# Patient Record
Sex: MALE | Race: WHITE | NOT HISPANIC OR LATINO | URBAN - METROPOLITAN AREA
[De-identification: names, ages, dates, MRNs, and addresses within clinical notes are randomized per-mention and may not be internally consistent; named-entity substitution may affect disease eponyms.]

---

## 2017-10-11 ENCOUNTER — OFFICE (AMBULATORY)
Dept: URBAN - METROPOLITAN AREA CLINIC 64 | Facility: CLINIC | Age: 52
End: 2017-10-11

## 2017-10-11 VITALS
WEIGHT: 278 LBS | HEART RATE: 72 BPM | HEIGHT: 68 IN | SYSTOLIC BLOOD PRESSURE: 137 MMHG | DIASTOLIC BLOOD PRESSURE: 84 MMHG

## 2017-10-11 DIAGNOSIS — R74.0 NONSPECIFIC ELEVATION OF LEVELS OF TRANSAMINASE AND LACTIC A: ICD-10-CM

## 2017-10-11 DIAGNOSIS — K62.5 HEMORRHAGE OF ANUS AND RECTUM: ICD-10-CM

## 2017-10-11 LAB
COMP. METABOLIC PANEL (14): A/G RATIO: 1.4 (ref 1.2–2.2)
COMP. METABOLIC PANEL (14): ALBUMIN, SERUM: 4.4 G/DL (ref 3.5–5.5)
COMP. METABOLIC PANEL (14): ALKALINE PHOSPHATASE, S: 72 IU/L (ref 39–117)
COMP. METABOLIC PANEL (14): ALT (SGPT): 71 IU/L — HIGH (ref 0–44)
COMP. METABOLIC PANEL (14): AST (SGOT): 51 IU/L — HIGH (ref 0–40)
COMP. METABOLIC PANEL (14): BILIRUBIN, TOTAL: 0.6 MG/DL (ref 0–1.2)
COMP. METABOLIC PANEL (14): BUN/CREATININE RATIO: 13 (ref 9–20)
COMP. METABOLIC PANEL (14): BUN: 11 MG/DL (ref 6–24)
COMP. METABOLIC PANEL (14): CALCIUM, SERUM: 9.8 MG/DL (ref 8.7–10.2)
COMP. METABOLIC PANEL (14): CARBON DIOXIDE, TOTAL: 22 MMOL/L (ref 18–29)
COMP. METABOLIC PANEL (14): CHLORIDE, SERUM: 93 MMOL/L — LOW (ref 96–106)
COMP. METABOLIC PANEL (14): CREATININE, SERUM: 0.87 MG/DL (ref 0.76–1.27)
COMP. METABOLIC PANEL (14): EGFR IF AFRICN AM: 116 ML/MIN/1.73 (ref 59–?)
COMP. METABOLIC PANEL (14): EGFR IF NONAFRICN AM: 101 ML/MIN/1.73 (ref 59–?)
COMP. METABOLIC PANEL (14): GLOBULIN, TOTAL: 3.1 G/DL (ref 1.5–4.5)
COMP. METABOLIC PANEL (14): GLUCOSE, SERUM: 373 MG/DL — HIGH (ref 65–99)
COMP. METABOLIC PANEL (14): POTASSIUM, SERUM: 4.7 MMOL/L (ref 3.5–5.2)
COMP. METABOLIC PANEL (14): PROTEIN, TOTAL, SERUM: 7.5 G/DL (ref 6–8.5)
COMP. METABOLIC PANEL (14): SODIUM, SERUM: 135 MMOL/L (ref 134–144)
URINALYSIS, ROUTINE: APPEARANCE: CLEAR
URINALYSIS, ROUTINE: BILIRUBIN: NEGATIVE
URINALYSIS, ROUTINE: GLUCOSE: ABNORMAL
URINALYSIS, ROUTINE: KETONES: NEGATIVE
URINALYSIS, ROUTINE: MICROSCOPIC EXAMINATION: (no result)
URINALYSIS, ROUTINE: NITRITE, URINE: NEGATIVE
URINALYSIS, ROUTINE: OCCULT BLOOD: NEGATIVE
URINALYSIS, ROUTINE: PH: 6 (ref 5–7.5)
URINALYSIS, ROUTINE: PROTEIN: NEGATIVE
URINALYSIS, ROUTINE: SPECIFIC GRAVITY: >=1.03
URINALYSIS, ROUTINE: URINE-COLOR: YELLOW
URINALYSIS, ROUTINE: UROBILINOGEN,SEMI-QN: 0.2 MG/DL (ref 0.2–1)
URINALYSIS, ROUTINE: WBC ESTERASE: NEGATIVE

## 2017-10-11 PROCEDURE — 99202 OFFICE O/P NEW SF 15 MIN: CPT | Performed by: INTERNAL MEDICINE

## 2017-11-07 ENCOUNTER — ON CAMPUS - OUTPATIENT (AMBULATORY)
Dept: URBAN - METROPOLITAN AREA HOSPITAL 2 | Facility: HOSPITAL | Age: 52
End: 2017-11-07

## 2017-11-07 ENCOUNTER — OFFICE (AMBULATORY)
Dept: URBAN - METROPOLITAN AREA CLINIC 64 | Facility: CLINIC | Age: 52
End: 2017-11-07
Payer: COMMERCIAL

## 2017-11-07 VITALS
DIASTOLIC BLOOD PRESSURE: 79 MMHG | RESPIRATION RATE: 18 BRPM | SYSTOLIC BLOOD PRESSURE: 118 MMHG | SYSTOLIC BLOOD PRESSURE: 137 MMHG | RESPIRATION RATE: 16 BRPM | DIASTOLIC BLOOD PRESSURE: 75 MMHG | DIASTOLIC BLOOD PRESSURE: 45 MMHG | HEART RATE: 76 BPM | OXYGEN SATURATION: 100 % | SYSTOLIC BLOOD PRESSURE: 136 MMHG | OXYGEN SATURATION: 95 % | SYSTOLIC BLOOD PRESSURE: 147 MMHG | HEART RATE: 75 BPM | DIASTOLIC BLOOD PRESSURE: 73 MMHG | DIASTOLIC BLOOD PRESSURE: 84 MMHG | OXYGEN SATURATION: 98 % | SYSTOLIC BLOOD PRESSURE: 134 MMHG | OXYGEN SATURATION: 99 % | HEART RATE: 72 BPM | DIASTOLIC BLOOD PRESSURE: 70 MMHG | SYSTOLIC BLOOD PRESSURE: 126 MMHG | HEIGHT: 68 IN | DIASTOLIC BLOOD PRESSURE: 87 MMHG | OXYGEN SATURATION: 96 % | TEMPERATURE: 97.9 F | SYSTOLIC BLOOD PRESSURE: 125 MMHG | HEART RATE: 78 BPM | SYSTOLIC BLOOD PRESSURE: 132 MMHG | SYSTOLIC BLOOD PRESSURE: 141 MMHG | WEIGHT: 263 LBS | DIASTOLIC BLOOD PRESSURE: 81 MMHG | OXYGEN SATURATION: 97 % | DIASTOLIC BLOOD PRESSURE: 94 MMHG

## 2017-11-07 DIAGNOSIS — K64.1 SECOND DEGREE HEMORRHOIDS: ICD-10-CM

## 2017-11-07 DIAGNOSIS — K52.9 NONINFECTIVE GASTROENTERITIS AND COLITIS, UNSPECIFIED: ICD-10-CM

## 2017-11-07 DIAGNOSIS — K57.30 DIVERTICULOSIS OF LARGE INTESTINE WITHOUT PERFORATION OR ABS: ICD-10-CM

## 2017-11-07 DIAGNOSIS — K62.5 HEMORRHAGE OF ANUS AND RECTUM: ICD-10-CM

## 2017-11-07 DIAGNOSIS — K63.3 ULCER OF INTESTINE: ICD-10-CM

## 2017-11-07 LAB
GI HISTOLOGY: A. SELECT: (no result)
GI HISTOLOGY: B. UNSPECIFIED: (no result)
GI HISTOLOGY: PDF REPORT: (no result)

## 2017-11-07 PROCEDURE — 45380 COLONOSCOPY AND BIOPSY: CPT | Performed by: INTERNAL MEDICINE

## 2017-11-07 PROCEDURE — 88305 TISSUE EXAM BY PATHOLOGIST: CPT | Performed by: INTERNAL MEDICINE

## 2017-11-07 RX ORDER — HYDROCORTISONE 25 MG/G
5 CREAM TOPICAL
Qty: 1 | Refills: 1 | Status: ACTIVE
Start: 2017-11-07

## 2017-11-07 RX ADMIN — PROPOFOL: 10 INJECTION, EMULSION INTRAVENOUS at 14:15

## 2017-11-08 ENCOUNTER — HOSPITAL ENCOUNTER (OUTPATIENT)
Dept: LAB | Facility: HOSPITAL | Age: 52
Setting detail: SPECIMEN
Discharge: HOME | End: 2017-11-08
Attending: INTERNAL MEDICINE | Admitting: INTERNAL MEDICINE

## 2017-11-08 LAB
ANION GAP SERPL CALC-SCNC: 11.9 MMOL/L (ref 10–20)
BUN SERPL-MCNC: 12 MG/DL (ref 8–20)
BUN/CREAT SERPL: 12 (ref 6.2–20.3)
CALCIUM SERPL-MCNC: 9.3 MG/DL (ref 8.9–10.3)
CHLORIDE SERPL-SCNC: 105 MMOL/L (ref 101–111)
CONV CO2: 25 MMOL/L (ref 22–32)
CREAT UR-MCNC: 1 MG/DL (ref 0.7–1.2)
GLUCOSE SERPL-MCNC: 119 MG/DL (ref 65–99)
POTASSIUM SERPL-SCNC: 3.9 MMOL/L (ref 3.6–5.1)
SODIUM SERPL-SCNC: 138 MMOL/L (ref 136–144)

## 2017-12-11 ENCOUNTER — OFFICE (AMBULATORY)
Dept: URBAN - METROPOLITAN AREA CLINIC 64 | Facility: CLINIC | Age: 52
End: 2017-12-11

## 2017-12-11 VITALS
HEART RATE: 81 BPM | WEIGHT: 267 LBS | SYSTOLIC BLOOD PRESSURE: 102 MMHG | DIASTOLIC BLOOD PRESSURE: 66 MMHG | HEIGHT: 68 IN

## 2017-12-11 DIAGNOSIS — R74.0 NONSPECIFIC ELEVATION OF LEVELS OF TRANSAMINASE AND LACTIC A: ICD-10-CM

## 2017-12-11 DIAGNOSIS — K62.5 HEMORRHAGE OF ANUS AND RECTUM: ICD-10-CM

## 2017-12-11 LAB
COMP. METABOLIC PANEL (14): A/G RATIO: 1.7 (ref 1.2–2.2)
COMP. METABOLIC PANEL (14): ALBUMIN, SERUM: 4.5 G/DL (ref 3.5–5.5)
COMP. METABOLIC PANEL (14): ALKALINE PHOSPHATASE, S: 57 IU/L (ref 39–117)
COMP. METABOLIC PANEL (14): ALT (SGPT): 56 IU/L — HIGH (ref 0–44)
COMP. METABOLIC PANEL (14): AST (SGOT): 38 IU/L (ref 0–40)
COMP. METABOLIC PANEL (14): BILIRUBIN, TOTAL: 0.7 MG/DL (ref 0–1.2)
COMP. METABOLIC PANEL (14): BUN/CREATININE RATIO: 11 (ref 9–20)
COMP. METABOLIC PANEL (14): BUN: 11 MG/DL (ref 6–24)
COMP. METABOLIC PANEL (14): CALCIUM, SERUM: 9.4 MG/DL (ref 8.7–10.2)
COMP. METABOLIC PANEL (14): CARBON DIOXIDE, TOTAL: 24 MMOL/L (ref 18–29)
COMP. METABOLIC PANEL (14): CHLORIDE, SERUM: 100 MMOL/L (ref 96–106)
COMP. METABOLIC PANEL (14): CREATININE, SERUM: 0.96 MG/DL (ref 0.76–1.27)
COMP. METABOLIC PANEL (14): EGFR IF AFRICN AM: 106 ML/MIN/1.73 (ref 59–?)
COMP. METABOLIC PANEL (14): EGFR IF NONAFRICN AM: 92 ML/MIN/1.73 (ref 59–?)
COMP. METABOLIC PANEL (14): GLOBULIN, TOTAL: 2.6 G/DL (ref 1.5–4.5)
COMP. METABOLIC PANEL (14): GLUCOSE, SERUM: 109 MG/DL — HIGH (ref 65–99)
COMP. METABOLIC PANEL (14): POTASSIUM, SERUM: 4.5 MMOL/L (ref 3.5–5.2)
COMP. METABOLIC PANEL (14): PROTEIN, TOTAL, SERUM: 7.1 G/DL (ref 6–8.5)
COMP. METABOLIC PANEL (14): SODIUM, SERUM: 142 MMOL/L (ref 134–144)

## 2017-12-11 PROCEDURE — 99213 OFFICE O/P EST LOW 20 MIN: CPT | Performed by: INTERNAL MEDICINE

## 2017-12-13 ENCOUNTER — HOSPITAL ENCOUNTER (OUTPATIENT)
Dept: LAB | Facility: HOSPITAL | Age: 52
Setting detail: SPECIMEN
Discharge: HOME | End: 2017-12-13
Attending: INTERNAL MEDICINE | Admitting: INTERNAL MEDICINE

## 2017-12-13 LAB
ALBUMIN SERPL-MCNC: 4.2 G/DL (ref 3.5–4.8)
ALBUMIN/GLOB SERPL: 1.3 {RATIO} (ref 1–1.7)
ALP SERPL-CCNC: 52 IU/L (ref 32–91)
ALT SERPL-CCNC: 56 IU/L (ref 17–63)
ANION GAP SERPL CALC-SCNC: 11.4 MMOL/L (ref 10–20)
AST SERPL-CCNC: 33 IU/L (ref 15–41)
BILIRUB SERPL-MCNC: 0.9 MG/DL (ref 0.3–1.2)
BUN SERPL-MCNC: 14 MG/DL (ref 8–20)
BUN/CREAT SERPL: 15.6 (ref 6.2–20.3)
CALCIUM SERPL-MCNC: 9.1 MG/DL (ref 8.9–10.3)
CHLORIDE SERPL-SCNC: 105 MMOL/L (ref 101–111)
CHOLEST SERPL-MCNC: 147 MG/DL
CHOLEST/HDLC SERPL: 4.8 {RATIO}
CONV CO2: 25 MMOL/L (ref 22–32)
CONV LDL CHOLESTEROL DIRECT: 89 MG/DL (ref 0–100)
CONV TOTAL PROTEIN: 7.4 G/DL (ref 6.1–7.9)
CREAT UR-MCNC: 0.9 MG/DL (ref 0.7–1.2)
GLOBULIN UR ELPH-MCNC: 3.2 G/DL (ref 2.5–3.8)
GLUCOSE SERPL-MCNC: 131 MG/DL (ref 65–99)
HDLC SERPL-MCNC: 30 MG/DL
LDLC/HDLC SERPL: 2.9 {RATIO}
LIPID INTERPRETATION: ABNORMAL
POTASSIUM SERPL-SCNC: 4.4 MMOL/L (ref 3.6–5.1)
SODIUM SERPL-SCNC: 137 MMOL/L (ref 136–144)
TRIGL SERPL-MCNC: 109 MG/DL
VLDLC SERPL CALC-MCNC: 28 MG/DL

## 2018-02-07 ENCOUNTER — HOSPITAL ENCOUNTER (OUTPATIENT)
Dept: LAB | Facility: HOSPITAL | Age: 53
Setting detail: SPECIMEN
Discharge: HOME | End: 2018-02-07
Attending: INTERNAL MEDICINE | Admitting: INTERNAL MEDICINE

## 2018-06-06 ENCOUNTER — HOSPITAL ENCOUNTER (OUTPATIENT)
Dept: LAB | Facility: HOSPITAL | Age: 53
Setting detail: SPECIMEN
Discharge: HOME | End: 2018-06-06
Attending: INTERNAL MEDICINE | Admitting: INTERNAL MEDICINE

## 2018-06-06 LAB
ANION GAP SERPL CALC-SCNC: 14 MMOL/L (ref 10–20)
BUN SERPL-MCNC: 16 MG/DL (ref 8–20)
BUN/CREAT SERPL: 16 (ref 6.2–20.3)
CALCIUM SERPL-MCNC: 9 MG/DL (ref 8.9–10.3)
CHLORIDE SERPL-SCNC: 100 MMOL/L (ref 101–111)
CONV CO2: 25 MMOL/L (ref 22–32)
CREAT UR-MCNC: 1 MG/DL (ref 0.7–1.2)
GLUCOSE SERPL-MCNC: 108 MG/DL (ref 65–99)
POTASSIUM SERPL-SCNC: 4 MMOL/L (ref 3.6–5.1)
SODIUM SERPL-SCNC: 135 MMOL/L (ref 136–144)

## 2018-12-03 ENCOUNTER — HOSPITAL ENCOUNTER (OUTPATIENT)
Dept: LAB | Facility: HOSPITAL | Age: 53
Setting detail: SPECIMEN
Discharge: HOME | End: 2018-12-03
Attending: INTERNAL MEDICINE | Admitting: INTERNAL MEDICINE

## 2018-12-03 LAB
ALBUMIN SERPL-MCNC: 3.9 G/DL (ref 3.5–4.8)
ALBUMIN/GLOB SERPL: 1.6 {RATIO} (ref 1–1.7)
ALP SERPL-CCNC: 52 IU/L (ref 32–91)
ALT SERPL-CCNC: 60 IU/L (ref 17–63)
ANION GAP SERPL CALC-SCNC: 13.5 MMOL/L (ref 10–20)
AST SERPL-CCNC: 36 IU/L (ref 15–41)
BILIRUB SERPL-MCNC: 1.2 MG/DL (ref 0.3–1.2)
BUN SERPL-MCNC: 18 MG/DL (ref 8–20)
BUN/CREAT SERPL: 20 (ref 6.2–20.3)
CALCIUM SERPL-MCNC: 8.9 MG/DL (ref 8.9–10.3)
CHLORIDE SERPL-SCNC: 104 MMOL/L (ref 101–111)
CHOLEST SERPL-MCNC: 143 MG/DL
CHOLEST/HDLC SERPL: 4.2 {RATIO}
CONV CO2: 25 MMOL/L (ref 22–32)
CONV LDL CHOLESTEROL DIRECT: 92 MG/DL (ref 0–100)
CONV TOTAL PROTEIN: 6.3 G/DL (ref 6.1–7.9)
CREAT UR-MCNC: 0.9 MG/DL (ref 0.7–1.2)
GLOBULIN UR ELPH-MCNC: 2.4 G/DL (ref 2.5–3.8)
GLUCOSE SERPL-MCNC: 139 MG/DL (ref 65–99)
HBA1C MFR BLD: 6.1 % (ref 0–5.6)
HDLC SERPL-MCNC: 34 MG/DL
LDLC/HDLC SERPL: 2.7 {RATIO}
LIPID INTERPRETATION: ABNORMAL
POTASSIUM SERPL-SCNC: 4.5 MMOL/L (ref 3.6–5.1)
SODIUM SERPL-SCNC: 138 MMOL/L (ref 136–144)
TRIGL SERPL-MCNC: 224 MG/DL
VLDLC SERPL CALC-MCNC: 17.2 MG/DL

## 2019-03-21 ENCOUNTER — HOSPITAL ENCOUNTER (OUTPATIENT)
Dept: CARDIOLOGY | Facility: HOSPITAL | Age: 54
Discharge: HOME | End: 2019-03-21
Attending: INTERNAL MEDICINE | Admitting: INTERNAL MEDICINE

## 2019-04-23 ENCOUNTER — HOSPITAL ENCOUNTER (OUTPATIENT)
Dept: CARDIOLOGY | Facility: HOSPITAL | Age: 54
Discharge: HOME | End: 2019-04-23

## 2019-05-29 ENCOUNTER — HOSPITAL ENCOUNTER (OUTPATIENT)
Dept: LAB | Facility: HOSPITAL | Age: 54
Setting detail: SPECIMEN
Discharge: HOME | End: 2019-05-29
Attending: NURSE PRACTITIONER | Admitting: NURSE PRACTITIONER

## 2019-05-29 LAB
ALBUMIN SERPL-MCNC: 4.1 G/DL (ref 3.5–4.8)
ALBUMIN/GLOB SERPL: 1.4 {RATIO} (ref 1–1.7)
ALP SERPL-CCNC: 45 IU/L (ref 32–91)
ALT SERPL-CCNC: 68 IU/L (ref 17–63)
ANION GAP SERPL CALC-SCNC: 16.2 MMOL/L (ref 10–20)
AST SERPL-CCNC: 39 IU/L (ref 15–41)
BILIRUB SERPL-MCNC: 0.8 MG/DL (ref 0.3–1.2)
BUN SERPL-MCNC: 16 MG/DL (ref 8–20)
BUN/CREAT SERPL: 16 (ref 6.2–20.3)
CALCIUM SERPL-MCNC: 9.6 MG/DL (ref 8.9–10.3)
CHLORIDE SERPL-SCNC: 105 MMOL/L (ref 101–111)
CHOLEST SERPL-MCNC: 139 MG/DL
CHOLEST/HDLC SERPL: 5.1 {RATIO}
CONV CO2: 24 MMOL/L (ref 22–32)
CONV LDL CHOLESTEROL DIRECT: 87 MG/DL (ref 0–100)
CONV TOTAL PROTEIN: 7.1 G/DL (ref 6.1–7.9)
CREAT UR-MCNC: 1 MG/DL (ref 0.7–1.2)
GLOBULIN UR ELPH-MCNC: 3 G/DL (ref 2.5–3.8)
GLUCOSE SERPL-MCNC: 131 MG/DL (ref 65–99)
HBA1C MFR BLD: 6.3 % (ref 0–5.6)
HDLC SERPL-MCNC: 27 MG/DL
LDLC/HDLC SERPL: 3.2 {RATIO}
LIPID INTERPRETATION: ABNORMAL
POTASSIUM SERPL-SCNC: 5.2 MMOL/L (ref 3.6–5.1)
SODIUM SERPL-SCNC: 140 MMOL/L (ref 136–144)
TRIGL SERPL-MCNC: 178 MG/DL
VLDLC SERPL CALC-MCNC: 25.2 MG/DL

## 2019-06-05 ENCOUNTER — CONVERSION ENCOUNTER (OUTPATIENT)
Dept: ENDOCRINOLOGY | Facility: CLINIC | Age: 54
End: 2019-06-05

## 2019-06-05 VITALS
WEIGHT: 269 LBS | BODY MASS INDEX: 42.22 KG/M2 | DIASTOLIC BLOOD PRESSURE: 74 MMHG | HEIGHT: 67 IN | SYSTOLIC BLOOD PRESSURE: 110 MMHG | HEART RATE: 68 BPM | OXYGEN SATURATION: 98 %

## 2019-06-06 NOTE — PROGRESS NOTES
Visit Type:  Follow-up Visit  Referring Provider:  Yomaira Matos   Primary Provider:  Yomaira    CC:  DM 2.    History of Present Illness:  This is a 53 years old male who presents with follow-up of diabetes Type 2.  He takes Metformin 500  1 +2. The patient denies polyuria, nocturia and nocturnal hypoglycemia.  The patient denies any neuropathic pain, numbness and angina.  Since the last   visit the patient admits to HBGM testing - 1-2 x daily:, dietary compliance is good and complying with medications.  A1c is 6.3 % .  HTN: He takes Lisinopril 10 mg daily.  potassium is 5.2 ( H ) .         Standards of Care   Discussed Carrying Glucose Source: Advised  Discussed Wear DM Alert ID: Advised  NO  Last Eye Exam: 2018  Influenza vaccine: REFUSED  Pneumovax: REFUSED      Past Medical History:     Reviewed history from 03/20/2019 and no changes required:        Diabetes, Type 2        Hyperlipidemia        Hypertension    Past Surgical History:     Reviewed history from 11/08/2017 and no changes required:        none    Family History Summary:      Reviewed history Last on 03/20/2019 and no changes required:06/05/2019  Father - Has Heart Disease - Entered On: 11/8/2017  Mother - Has Heart Disease - Entered On: 11/8/2017  Father - Has Other Cancer - Entered On: 11/8/2017  Mother - Has High Cholesterol - Entered On: 11/8/2017      Social History:     Reviewed history from 03/20/2019 and no changes required:        Patient has never smoked.        Passive Smoke: N        Alcohol Use: N        Drug Use: N        HIV/High Risk: N        Regular Exercise: N                Risk Factors:     Smoked Tobacco Use:  Never smoker  Smokeless Tobacco Use:  Never  Passive smoke exposure:  no  Drug use:  no  HIV high-risk behavior:  no  Alcohol use:  no  Exercise:  no    Previous Tobacco Use: Signed On - 05/02/2019  Smoked Tobacco Use:  Never smoker  Smokeless Tobacco Use:  Never  Passive smoke exposure:  no  Drug use:  no  HIV high-risk  behavior:  no    Previous Alcohol Use: Signed On - 05/02/2019  Alcohol use:  no  Exercise:  no    Active Medications (reviewed today):  LISINOPRIL 10MG TABLETS (LISINOPRIL) TAKE 1 TABLET BY MOUTH DAILY  ATORVASTATIN 20MG TABLETS (ATORVASTATIN CALCIUM) TAKE 1 TABLET BY MOUTH DAILY  METFORMIN ER 500MG 24HR TABS (METFORMIN HCL) TAKE 1 TABLET BY MOUTH EVERY MORNING AND 2 TABLETS BY MOUTH WITH EVENING MEAL  E400 CAPSULE (VITAMIN E CAPS) 2 caps daily  [employer] FISH OIL 1000 MG ORAL CAPSULE (OMEGA-3 FATTY ACIDS) 1 cap daily    Current Allergies (reviewed today):  No known allergies      Review of Systems     General       Denies fever, fever & chills, sweat, sweating, appetite loss, appetite, weight loss, malaise and fatigue.    Eyes       Denies vision loss - 1 eye, double vision, eye irritation, vision loss - both eyes, blurring, eye pain, halos, discharge and light sensitivity.    ENT       Denies ringing in the ears, ear discharge, earache, decreased hearing, nasal congestion, nosebleeds, difficulty swallowing, hoarseness and sore throat.    CV       Denies difficulty breathing at night, near fainting, chest pain or discomfort, racing/skipping heart beats, fatigue, lightheadedness, shortness of breath with exertion, palpitations, swelling of hands or feet, difficulty breathing while lying down,   fainting, leg cramps with exertion, bluish discoloration of lips or nails and weight gain.    Resp       Denies sleep disturbances due to breathing, cough, shortness of breath, coughing up blood, chest discomfort, wheezing, excessive sputum and excessive snoring.    GI       Denies nausea, vomiting, yellowish skin color, abdominal pain, diarrhea and bloody stools.           Denies dysuria, urinary frequency and incontinence.    MS       Denies muscle cramps, joint pain, joint swelling, presence of joint fluid, back pain, stiffness, muscle weakness, arthritis, gout, loss of strength and muscle aches.    Derm       Denies excessive  perspiration, night sweats, suspicious lesions, changes in nail beds, dryness, poor wound healing, unusual hair distribution, skin cancer, itching, changes in color of skin, flushing and rash.    Neuro       Denies difficulty with concentration, poor balance, headaches, disturbances in coordination, numbness, inability to speak, falling down, tingling, brief paralysis, visual disturbances, seizures, weakness, sensation of room spinning, tremors, fainting,   excessive daytime sleeping and memory loss.    Psych       Denies sense of great danger, anxiety, thoughts of suicide, mental problems, depression, thoughts of violence and frightening visions or sounds.    Endo       Denies excessive hunger, cold intolerance, heat intolerance, excessive urination, excessive thirst and weight change.    Heme       Denies enlarged lymph nodes, bleeding, skin discoloration, abnormal bruising and fevers.    Allergy       Denies persistent infections, hives or rash, seasonal allergies and HIV exposure.      Vital Signs:    Patient Profile:    53 Years Old Male  Height:     67 inches  Weight:     269 pounds  BMI:        42.13     O2 Sat:     98 %  Pulse rate: 68 / minute  BP Sittin / 74  (left arm)    Cuff size:  large      Problems: Active problems were reviewed with the patient during this visit.  Medications: Medications were reviewed with the patient during this visit.  Allergies: Allergies were reviewed with the patient during this visit.  No Known Allergy.        Vitals Entered By: Vilma Bowers (2019 8:13 AM)      Physical Exam    General:      Well developed, well nourished, in no acute distress.  Head:      Normocephalic and atraumatic.  Eyes:      Head, eyes, ears, nose and throat examination reveals pupils that are equally reactive light. Conjunctivae are clear.   Neck:      Neck is supple with no JVD or LAD.  Thyroid is not palpable.  No Carotid bruits are heard at this time.  Lungs:      Chest and lungs  are symmetrical and clear to auscultation bilaterally.  Heart:      Cardiovascular examination reveals S1 and S2 are regular with no murmurs heard at this time.  Abdomen:      Abdomen is soft and nontender with no organomegaly.  Bowel sounds are positive.  Msk:      No deformity or scoliosis noted with normal posture and gait.  Extremities:      no clubbing, cyanosis, edema, or deformity noted   Neurologic:      No focal deficits, CN II-XII grossly intact. Hearing normal to conversational speech.   Skin:      Intact without lesions or rashes.  Cervical Nodes:      No significant adenopathy.  Psych:      Alert and cooperative; normal mood and affect; normal attention span and concentration.      Blood Pressure:  Today's BP: 110/74 mm Hg    Labwork:   Most Recent Lab Results:   LDL: 87 mg/dL 05/29/2019  HbA1c: : 6.3 % 05/29/2019      Impression & Recommendations:    Problem # 1:  Diabetes, Type 2 (ICD-250.00) (KVY01-E51.9)  A1c is at goal. Continue metformin same dose .   His updated medication list for this problem includes:     Metformin Er 500mg 24hr Tabs (Metformin hcl) ..... Take 1 tablet by mouth every morning and 2 tablets by mouth with evening meal    Orders:  Glucose (84019)  Ofc Vst, Est Level IV (18151)  [facility] COMPREHENSIVE METABOLIC PANEL (CMP) (MPC)  [facility] HEMOGLOBIN A1c (A1DCA)  [facility] LIPID PANEL (LIPID)  [facility] MICROALBUMIN/CREATININE RATIO (MACRE)      Problem # 2:  Hypertension, benign (ICD-401.1) ([id number])  Potassium is high - Discontinue Lisinopril .   The following medications were removed from the medication list:     Lisinopril 10mg Tablets (Lisinopril) ..... Take 1 tablet by mouth daily    Orders:  Ofc Vst, Est Level IV (08621)  [facility] COMPREHENSIVE METABOLIC PANEL (CMP) (MPC)  [facility] HEMOGLOBIN A1c (A1DCA)  [facility] LIPID PANEL (LIPID)  [facility] MICROALBUMIN/CREATININE RATIO (MACRE)      Problem # 3:  Hyperlipidemia (ICD-272.4) (SJI42-F35.5)  LDL is at goal. Continue Statins   His updated medication list for this  problem includes:     Atorvastatin 20mg Tablets (Atorvastatin calcium) ..... Take 1 tablet by mouth daily    Orders:  Ofc Vst, Est Level IV (74787)  [facility] COMPREHENSIVE METABOLIC PANEL (CMP) (MPC)  [facility] HEMOGLOBIN A1c (A1DCA)  [facility] LIPID PANEL (LIPID)  [facility] MICROALBUMIN/CREATININE RATIO (MACRE)      Problem # 4:  Fatty liver disease (ICD-571.8) (ZGI59-Y00.0)  ALT is elevated - Continue to monitor - Advised for weight loss .   Orders:  Ofc Vst, Est Level IV (84103)  [facility] COMPREHENSIVE METABOLIC PANEL (CMP) (MPC)  [facility] HEMOGLOBIN A1c (A1DCA)  [facility] LIPID PANEL (LIPID)  [facility] MICROALBUMIN/CREATININE RATIO (MACRE)      Other Orders:  Glucose (88816)      Patient Instructions:  1)  Please schedule a follow-up appointment in 6 months.  2)  Discussed importance of regular exercise and recommended starting or continuing a regular exercise program for good health.  3)  The patient was encouraged to lose weight for better health.                    Medication Administration    Orders Added:  1)  Glucose [87797]  2)  Ofc Vst, Est Level IV [85387]  3)  [facility] COMPREHENSIVE METABOLIC PANEL (CMP) [MPC]  4)  [facility] HEMOGLOBIN A1c [A1DCA]  5)  [facility] LIPID PANEL [LIPID]  6)  [facility] MICROALBUMIN/CREATININE RATIO [MACRE]  ]  Technician: Anoop Gregory         Date/Time Collected: June 5, 2019 8:12 AM)  Date/Time Received: June 5, 2019 8:12 AM)  Performed by: ANOOP GREGORY    Glucose (fast): 107 mg/dL       65-99 mg/dL (normal range)                Electronically signed by Pradip Castelan MD on 06/05/2019 at 9:10 AM  ________________________________________________________________________       Disclaimer: Converted Note message may not contain all data elements that existed in the legacy source system. Please see [employer] for the original note details.

## 2019-09-05 RX ORDER — ATORVASTATIN CALCIUM 20 MG/1
TABLET, FILM COATED ORAL
Qty: 90 TABLET | Refills: 0 | Status: SHIPPED | OUTPATIENT
Start: 2019-09-05 | End: 2020-01-16

## 2019-09-09 RX ORDER — METFORMIN HYDROCHLORIDE 500 MG/1
TABLET, EXTENDED RELEASE ORAL
COMMUNITY
Start: 2019-05-16 | End: 2019-09-09 | Stop reason: SDUPTHER

## 2019-09-09 RX ORDER — METFORMIN HYDROCHLORIDE 500 MG/1
TABLET, EXTENDED RELEASE ORAL
Qty: 270 TABLET | Refills: 1 | Status: SHIPPED | OUTPATIENT
Start: 2019-09-09 | End: 2021-06-01

## 2019-10-31 DIAGNOSIS — E78.5 HYPERLIPIDEMIA, UNSPECIFIED HYPERLIPIDEMIA TYPE: Primary | ICD-10-CM

## 2019-10-31 DIAGNOSIS — I10 HYPERTENSION, BENIGN: ICD-10-CM

## 2019-10-31 DIAGNOSIS — E11.65 TYPE 2 DIABETES MELLITUS WITH HYPERGLYCEMIA, UNSPECIFIED WHETHER LONG TERM INSULIN USE (HCC): ICD-10-CM

## 2019-10-31 PROBLEM — E11.9 TYPE 2 DIABETES MELLITUS WITHOUT COMPLICATIONS (HCC): Status: ACTIVE | Noted: 2018-02-28

## 2019-10-31 PROBLEM — I25.10 CORONARY ATHEROSCLEROSIS: Status: ACTIVE | Noted: 2019-05-02

## 2019-10-31 PROBLEM — Z71.89 OTHER SPECIFIED COUNSELING: Status: ACTIVE | Noted: 2019-03-20

## 2019-10-31 PROBLEM — K76.0 STEATOSIS OF LIVER: Status: ACTIVE | Noted: 2019-06-05

## 2019-10-31 PROBLEM — E55.9 VITAMIN D DEFICIENCY: Status: ACTIVE | Noted: 2018-02-28

## 2019-10-31 PROBLEM — R73.9 HYPERGLYCEMIA: Status: ACTIVE | Noted: 2017-11-08

## 2019-11-25 ENCOUNTER — LAB (OUTPATIENT)
Dept: LAB | Facility: HOSPITAL | Age: 54
End: 2019-11-25

## 2019-11-25 DIAGNOSIS — I10 HYPERTENSION, BENIGN: ICD-10-CM

## 2019-11-25 DIAGNOSIS — E78.5 HYPERLIPIDEMIA, UNSPECIFIED HYPERLIPIDEMIA TYPE: ICD-10-CM

## 2019-11-25 DIAGNOSIS — E11.65 TYPE 2 DIABETES MELLITUS WITH HYPERGLYCEMIA, UNSPECIFIED WHETHER LONG TERM INSULIN USE (HCC): ICD-10-CM

## 2019-11-25 LAB
25(OH)D3 SERPL-MCNC: 23.8 NG/ML (ref 30–100)
ALBUMIN SERPL-MCNC: 4.5 G/DL (ref 3.5–5.2)
ALBUMIN UR-MCNC: <1.2 MG/DL
ALBUMIN/GLOB SERPL: 1.5 G/DL
ALP SERPL-CCNC: 55 U/L (ref 39–117)
ALT SERPL W P-5'-P-CCNC: 70 U/L (ref 1–41)
ANION GAP SERPL CALCULATED.3IONS-SCNC: 11.1 MMOL/L (ref 5–15)
AST SERPL-CCNC: 40 U/L (ref 1–40)
BILIRUB SERPL-MCNC: 0.5 MG/DL (ref 0.2–1.2)
BUN BLD-MCNC: 14 MG/DL (ref 6–20)
BUN/CREAT SERPL: 15.7 (ref 7–25)
CALCIUM SPEC-SCNC: 9.2 MG/DL (ref 8.6–10.5)
CHLORIDE SERPL-SCNC: 102 MMOL/L (ref 98–107)
CHOLEST SERPL-MCNC: 150 MG/DL (ref 0–200)
CO2 SERPL-SCNC: 25.9 MMOL/L (ref 22–29)
CREAT BLD-MCNC: 0.89 MG/DL (ref 0.76–1.27)
CREAT UR-MCNC: 72.8 MG/DL
GFR SERPL CREATININE-BSD FRML MDRD: 90 ML/MIN/1.73
GLOBULIN UR ELPH-MCNC: 3.1 GM/DL
GLUCOSE BLD-MCNC: 131 MG/DL (ref 65–99)
HBA1C MFR BLD: 6.8 % (ref 3.5–5.6)
HDLC SERPL-MCNC: 33 MG/DL (ref 40–60)
LDLC SERPL CALC-MCNC: 91 MG/DL (ref 0–100)
LDLC/HDLC SERPL: 2.75 {RATIO}
MICROALBUMIN/CREAT UR: NORMAL MG/G{CREAT}
POTASSIUM BLD-SCNC: 4.7 MMOL/L (ref 3.5–5.2)
PROT SERPL-MCNC: 7.6 G/DL (ref 6–8.5)
SODIUM BLD-SCNC: 139 MMOL/L (ref 136–145)
TRIGL SERPL-MCNC: 131 MG/DL (ref 0–150)
VLDLC SERPL-MCNC: 26.2 MG/DL (ref 5–40)

## 2019-11-25 PROCEDURE — 82043 UR ALBUMIN QUANTITATIVE: CPT

## 2019-11-25 PROCEDURE — 80061 LIPID PANEL: CPT

## 2019-11-25 PROCEDURE — 80053 COMPREHEN METABOLIC PANEL: CPT

## 2019-11-25 PROCEDURE — 82570 ASSAY OF URINE CREATININE: CPT

## 2019-11-25 PROCEDURE — 82306 VITAMIN D 25 HYDROXY: CPT

## 2019-11-25 PROCEDURE — 83036 HEMOGLOBIN GLYCOSYLATED A1C: CPT

## 2019-11-25 PROCEDURE — 36415 COLL VENOUS BLD VENIPUNCTURE: CPT

## 2019-12-04 ENCOUNTER — OFFICE VISIT (OUTPATIENT)
Dept: ENDOCRINOLOGY | Facility: CLINIC | Age: 54
End: 2019-12-04

## 2019-12-04 VITALS
HEART RATE: 76 BPM | SYSTOLIC BLOOD PRESSURE: 132 MMHG | WEIGHT: 276 LBS | OXYGEN SATURATION: 98 % | DIASTOLIC BLOOD PRESSURE: 88 MMHG | HEIGHT: 68 IN | BODY MASS INDEX: 41.83 KG/M2

## 2019-12-04 DIAGNOSIS — E78.5 HYPERLIPIDEMIA, UNSPECIFIED HYPERLIPIDEMIA TYPE: ICD-10-CM

## 2019-12-04 DIAGNOSIS — E11.9 TYPE 2 DIABETES MELLITUS WITHOUT COMPLICATION, WITHOUT LONG-TERM CURRENT USE OF INSULIN (HCC): ICD-10-CM

## 2019-12-04 DIAGNOSIS — E11.65 TYPE 2 DIABETES MELLITUS WITH HYPERGLYCEMIA, UNSPECIFIED WHETHER LONG TERM INSULIN USE (HCC): Primary | ICD-10-CM

## 2019-12-04 DIAGNOSIS — E55.9 VITAMIN D DEFICIENCY: ICD-10-CM

## 2019-12-04 LAB — GLUCOSE BLDC GLUCOMTR-MCNC: 144 MG/DL (ref 70–130)

## 2019-12-04 PROCEDURE — 99213 OFFICE O/P EST LOW 20 MIN: CPT | Performed by: INTERNAL MEDICINE

## 2019-12-04 PROCEDURE — 82962 GLUCOSE BLOOD TEST: CPT | Performed by: INTERNAL MEDICINE

## 2019-12-04 NOTE — PROGRESS NOTES
Subjective   Judson Burnette is a 53 y.o. male.     History of Present Illness ;    This is a 53 years old male who presents with follow-up of diabetes Type 2.   The patient denies polyuria, nocturia and nocturnal hypoglycemia.  The patient denies any neuropathic pain, numbness and angina.  Since the last visit the patient admits to HBGM testing - 1-2 x daily:, dietary compliance is fair . Not been taking metformin ,and vitamin-D .          The following portions of the patient's history were reviewed and updated as appropriate: allergies, current medications, past family history, past medical history, past social history, past surgical history and problem list.    Review of Systems   Constitutional: Negative.    Eyes: Negative.    Respiratory: Negative.    Cardiovascular: Negative.    Gastrointestinal: Negative.    Endocrine: Negative.    Genitourinary: Negative for dysuria, frequency and urgency.   Musculoskeletal: negative for joint swelling,  Negative for myalgias.   Neurological: Negative for dizziness, light-headedness and memory problem.   Psychiatric/Behavioral: Negative for behavioral problems, hallucinations and depressed mood.       Physical Exam   Constitutional:  oriented to person, place, and time.  well-developed and well-nourished.   Head: Normocephalic and atraumatic.   Eyes: EOM are normal. Pupils are equal, round, and reactive to light.   Neck: Normal range of motion. Neck supple.   Cardiovascular: Normal rate, regular rhythm and normal heart sounds.   Pulmonary/Chest: Effort normal and breath sounds normal.   Abdominal: Soft. Bowel sounds are normal.   Musculoskeletal: Normal range of motion.   Neurological:  alert and oriented to person, place, and time.   Skin: Skin is warm.   Psychiatric: normal mood and affect.  behavior is normal. Judgment and thought content normal.       Assessment/Plan   Vitamin D deficiency  Advised to take Vitamin-D supplement .     Hyperlipidemia  Lipids are good .  Continue same .     Type 2 diabetes mellitus without complications (CMS/HCC)  Diabetes is controlled . Continue Metformin .

## 2020-01-16 RX ORDER — ATORVASTATIN CALCIUM 20 MG/1
TABLET, FILM COATED ORAL
Qty: 90 TABLET | Refills: 1 | Status: SHIPPED | OUTPATIENT
Start: 2020-01-16 | End: 2021-06-01

## 2020-06-09 ENCOUNTER — LAB (OUTPATIENT)
Dept: LAB | Facility: HOSPITAL | Age: 55
End: 2020-06-09

## 2020-06-09 ENCOUNTER — OFFICE VISIT (OUTPATIENT)
Dept: ENDOCRINOLOGY | Facility: CLINIC | Age: 55
End: 2020-06-09

## 2020-06-09 VITALS
SYSTOLIC BLOOD PRESSURE: 125 MMHG | HEART RATE: 70 BPM | WEIGHT: 268 LBS | BODY MASS INDEX: 40.62 KG/M2 | DIASTOLIC BLOOD PRESSURE: 80 MMHG | TEMPERATURE: 98.4 F | HEIGHT: 68 IN | OXYGEN SATURATION: 99 %

## 2020-06-09 DIAGNOSIS — E78.2 MIXED HYPERLIPIDEMIA: ICD-10-CM

## 2020-06-09 DIAGNOSIS — R73.9 HYPERGLYCEMIA: ICD-10-CM

## 2020-06-09 DIAGNOSIS — E11.65 TYPE 2 DIABETES MELLITUS WITH HYPERGLYCEMIA, WITHOUT LONG-TERM CURRENT USE OF INSULIN (HCC): ICD-10-CM

## 2020-06-09 DIAGNOSIS — E55.9 VITAMIN D DEFICIENCY: ICD-10-CM

## 2020-06-09 DIAGNOSIS — E11.9 TYPE 2 DIABETES MELLITUS WITHOUT COMPLICATION, WITHOUT LONG-TERM CURRENT USE OF INSULIN (HCC): ICD-10-CM

## 2020-06-09 DIAGNOSIS — E11.65 TYPE 2 DIABETES MELLITUS WITH HYPERGLYCEMIA, WITHOUT LONG-TERM CURRENT USE OF INSULIN (HCC): Primary | ICD-10-CM

## 2020-06-09 LAB
ALBUMIN SERPL-MCNC: 4.9 G/DL (ref 3.5–5.2)
ALBUMIN UR-MCNC: <1.2 MG/DL
ALBUMIN/GLOB SERPL: 2.3 G/DL
ALP SERPL-CCNC: 44 U/L (ref 39–117)
ALT SERPL W P-5'-P-CCNC: 73 U/L (ref 1–41)
ANION GAP SERPL CALCULATED.3IONS-SCNC: 12 MMOL/L (ref 5–15)
AST SERPL-CCNC: 49 U/L (ref 1–40)
BILIRUB SERPL-MCNC: 0.5 MG/DL (ref 0.2–1.2)
BUN BLD-MCNC: 13 MG/DL (ref 6–20)
BUN/CREAT SERPL: 26 (ref 7–25)
CALCIUM SPEC-SCNC: 7.7 MG/DL (ref 8.6–10.5)
CHLORIDE SERPL-SCNC: 102 MMOL/L (ref 98–107)
CHOLEST SERPL-MCNC: 176 MG/DL (ref 0–200)
CO2 SERPL-SCNC: 26 MMOL/L (ref 22–29)
CREAT BLD-MCNC: 0.5 MG/DL (ref 0.76–1.27)
CREAT UR-MCNC: 100.8 MG/DL
GFR SERPL CREATININE-BSD FRML MDRD: >150 ML/MIN/1.73
GLOBULIN UR ELPH-MCNC: 2.1 GM/DL
GLUCOSE BLD-MCNC: 118 MG/DL (ref 65–99)
GLUCOSE BLDC GLUCOMTR-MCNC: 128 MG/DL (ref 70–130)
HBA1C MFR BLD: 7 % (ref 3.5–5.6)
HDLC SERPL-MCNC: 30 MG/DL (ref 40–60)
LDLC SERPL CALC-MCNC: 118 MG/DL (ref 0–100)
LDLC/HDLC SERPL: 3.94 {RATIO}
MICROALBUMIN/CREAT UR: NORMAL MG/G{CREAT}
POTASSIUM BLD-SCNC: 4.9 MMOL/L (ref 3.5–5.2)
PROT SERPL-MCNC: 7 G/DL (ref 6–8.5)
SODIUM BLD-SCNC: 140 MMOL/L (ref 136–145)
TRIGL SERPL-MCNC: 139 MG/DL (ref 0–150)
VLDLC SERPL-MCNC: 27.8 MG/DL (ref 5–40)

## 2020-06-09 PROCEDURE — 82043 UR ALBUMIN QUANTITATIVE: CPT

## 2020-06-09 PROCEDURE — 80061 LIPID PANEL: CPT

## 2020-06-09 PROCEDURE — 99214 OFFICE O/P EST MOD 30 MIN: CPT | Performed by: INTERNAL MEDICINE

## 2020-06-09 PROCEDURE — 80053 COMPREHEN METABOLIC PANEL: CPT

## 2020-06-09 PROCEDURE — 82570 ASSAY OF URINE CREATININE: CPT

## 2020-06-09 PROCEDURE — 83036 HEMOGLOBIN GLYCOSYLATED A1C: CPT

## 2020-06-09 PROCEDURE — 82962 GLUCOSE BLOOD TEST: CPT | Performed by: INTERNAL MEDICINE

## 2020-06-09 PROCEDURE — 36415 COLL VENOUS BLD VENIPUNCTURE: CPT

## 2020-06-09 RX ORDER — CYCLOBENZAPRINE HCL 10 MG
TABLET ORAL
COMMUNITY
Start: 2020-05-23 | End: 2020-06-09

## 2020-06-09 NOTE — PROGRESS NOTES
Endocrine Progress Note Outpatient     Patient Care Team:  Shawna Burnette MD as PCP - General  Provider, No Known as PCP - Family Medicine    Chief Complaint: Follow-up type 2 diabetes    HPI: 54-year-old male with history of type 2 diabetes, hyperlipidemia, vitamin D deficiency and obesity is here for follow-up.he used to follow with Dr. Bloom 1 year and this is his first visit with us.  For type 2 diabetes: Is currently on metformin 500 mg, total of 3 pills a day.  He has been diabetes education, he checks his blood sugars on and off runs around 120, tolerating his medications well and trying to follow the diet the best he can.  Last eye exam was earlier this year and he usually checks it about every 6 months to 6 months.  Hyperlipidemia: On atorvastatin  Vitamin D deficiency: He tells me that he used to be on vitamin D but has not been taking it recently.    Past Medical History:   Diagnosis Date   • Hyperlipidemia    • Hypertension    • Type 2 diabetes mellitus (CMS/[facility])    • Vitamin D deficiency        Social History     Socioeconomic History   • Marital status: Single     Spouse name: Not on file   • Number of children: Not on file   • Years of education: Not on file   • Highest education level: Not on file   Tobacco Use   • Smoking status: Never Smoker   • Smokeless tobacco: Never Used   Substance and Sexual Activity   • Alcohol use: No   • Drug use: No   • Sexual activity: Defer       Family History   Problem Relation Age of Onset   • Heart disease Mother    • Heart disease Father    • Cancer Father         leukemia       No Known Allergies    ROS:   Constitutional:  Denies fatigue, tiredness.    Eyes:  Denies change in visual acuity   HENT:  Denies nasal congestion or sore throat   Respiratory: denies cough, shortness of breath.   Cardiovascular:  denies chest pain, edema   GI:  Denies abdominal pain, nausea, vomiting.   Musculoskeletal:  Denies back pain or joint pain   Integument:  Denies dry skin and  rash   Neurologic:  Denies headache, focal weakness or sensory changes   Endocrine:  Denies polyuria or polydipsia   Psychiatric:  Denies depression or anxiety      Vitals:    06/09/20 1028   BP: 125/80   Pulse: 70   Temp: 98.4 °F (36.9 °C)   SpO2: 99%       Physical Exam:  GEN: NAD, conversant, obese  EYES: EOMI, PERRL, no conjunctival erythema  NECK: no thyromegaly, full ROM   CV: RRR, no murmurs/rubs/gallops, no peripheral edema  LUNG: CTAB, no wheezes/rales/ronchi  SKIN: no rashes, no acanthosis  MSK: no deformities, full ROM of all extremities  NEURO: no tremors, DTR normal  PSYCH: AOX3, appropriate mood, affect normal      Results Review:     I reviewed the patient's new clinical results.    Lab Results   Component Value Date    HGBA1C 6.8 (H) 11/25/2019    HGBA1C 6.3 (H) 05/29/2019    HGBA1C 6.1 (H) 12/03/2018      Lab Results   Component Value Date    GLUCOSE 131 (H) 11/25/2019    BUN 14 11/25/2019    CREATININE 0.89 11/25/2019    EGFRIFNONA 90 11/25/2019    BCR 15.7 11/25/2019    K 4.7 11/25/2019    CO2 25.9 11/25/2019    CALCIUM 9.2 11/25/2019    ALBUMIN 4.50 11/25/2019    LABIL2 1.4 05/29/2019    AST 40 11/25/2019    ALT 70 (H) 11/25/2019    CHOL 150 11/25/2019    TRIG 131 11/25/2019    LDL 91 11/25/2019    HDL 33 (L) 11/25/2019         Medication Review: Reviewed.       Current Outpatient Medications:   •  atorvastatin (LIPITOR) 20 MG tablet, TAKE 1 TABLET BY MOUTH DAILY, Disp: 90 tablet, Rfl: 1  •  metFORMIN ER (GLUCOPHAGE-XR) 500 MG 24 hr tablet, Take 1 tablet in am and 2 tablets at suppertime, Disp: 270 tablet, Rfl: 1  •  Omega-3 Fatty Acids ([employer] FISH OIL) 1000 MG capsule, 1 capsule Daily., Disp: , Rfl:       Assessment/Plan   1.  Diabetes mellitus type 2: Last A1c was excellent however no recent labs available at this time.  I will check A1c today along with CMP and urine microalbumin creatinine ratio.  He is advised to continue current medications and continue to work on diet.  Also advised to get  regular eye exam and flu vaccine.  2.  Hyperlipidemia: On atorvastatin, will follow lipid panel.  3.  Vitamin D deficiency: Advised to resume vitamin D at least 2000 units a day.            Arianna Murphy MD FACE.

## 2020-06-09 NOTE — PATIENT INSTRUCTIONS
Continue current medications  Continue to work on your diet and activity  Please get all labs done today  Get regular eye exam and flu vaccine  Follow-up in 6 months.

## 2020-06-12 DIAGNOSIS — E11.65 TYPE 2 DIABETES MELLITUS WITH HYPERGLYCEMIA, WITHOUT LONG-TERM CURRENT USE OF INSULIN (HCC): Primary | ICD-10-CM

## 2020-06-19 ENCOUNTER — LAB (OUTPATIENT)
Dept: LAB | Facility: HOSPITAL | Age: 55
End: 2020-06-19

## 2020-06-19 DIAGNOSIS — E11.65 TYPE 2 DIABETES MELLITUS WITH HYPERGLYCEMIA, WITHOUT LONG-TERM CURRENT USE OF INSULIN (HCC): ICD-10-CM

## 2020-06-19 LAB
ALBUMIN SERPL-MCNC: 4.4 G/DL (ref 3.5–5.2)
ALBUMIN/GLOB SERPL: 1.6 G/DL
ALP SERPL-CCNC: 48 U/L (ref 39–117)
ALT SERPL W P-5'-P-CCNC: 83 U/L (ref 1–41)
ANION GAP SERPL CALCULATED.3IONS-SCNC: 12.5 MMOL/L (ref 5–15)
AST SERPL-CCNC: 38 U/L (ref 1–40)
BILIRUB SERPL-MCNC: 0.7 MG/DL (ref 0.2–1.2)
BUN BLD-MCNC: 12 MG/DL (ref 6–20)
BUN/CREAT SERPL: 12.8 (ref 7–25)
CALCIUM SPEC-SCNC: 9.6 MG/DL (ref 8.6–10.5)
CHLORIDE SERPL-SCNC: 99 MMOL/L (ref 98–107)
CO2 SERPL-SCNC: 25.5 MMOL/L (ref 22–29)
CREAT BLD-MCNC: 0.94 MG/DL (ref 0.76–1.27)
GFR SERPL CREATININE-BSD FRML MDRD: 84 ML/MIN/1.73
GLOBULIN UR ELPH-MCNC: 2.7 GM/DL
GLUCOSE BLD-MCNC: 134 MG/DL (ref 65–99)
POTASSIUM BLD-SCNC: 4.4 MMOL/L (ref 3.5–5.2)
PROT SERPL-MCNC: 7.1 G/DL (ref 6–8.5)
SODIUM BLD-SCNC: 137 MMOL/L (ref 136–145)

## 2020-06-19 PROCEDURE — 80053 COMPREHEN METABOLIC PANEL: CPT

## 2020-06-19 PROCEDURE — 36415 COLL VENOUS BLD VENIPUNCTURE: CPT

## 2020-11-24 ENCOUNTER — LAB (OUTPATIENT)
Dept: LAB | Facility: HOSPITAL | Age: 55
End: 2020-11-24

## 2020-11-24 DIAGNOSIS — E11.9 TYPE 2 DIABETES MELLITUS WITHOUT COMPLICATION, WITHOUT LONG-TERM CURRENT USE OF INSULIN (HCC): ICD-10-CM

## 2020-11-24 DIAGNOSIS — E78.2 MIXED HYPERLIPIDEMIA: ICD-10-CM

## 2020-11-24 DIAGNOSIS — E11.65 TYPE 2 DIABETES MELLITUS WITH HYPERGLYCEMIA, WITHOUT LONG-TERM CURRENT USE OF INSULIN (HCC): Primary | ICD-10-CM

## 2020-11-24 DIAGNOSIS — R73.9 HYPERGLYCEMIA: ICD-10-CM

## 2020-11-24 LAB
ALBUMIN SERPL-MCNC: 4.1 G/DL (ref 3.5–5.2)
ALBUMIN UR-MCNC: <1.2 MG/DL
ALBUMIN/GLOB SERPL: 1.3 G/DL
ALP SERPL-CCNC: 64 U/L (ref 39–117)
ALT SERPL W P-5'-P-CCNC: 83 U/L (ref 1–41)
ANION GAP SERPL CALCULATED.3IONS-SCNC: 8 MMOL/L (ref 5–15)
AST SERPL-CCNC: 39 U/L (ref 1–40)
BILIRUB SERPL-MCNC: 0.5 MG/DL (ref 0–1.2)
BUN SERPL-MCNC: 13 MG/DL (ref 6–20)
BUN/CREAT SERPL: 14.6 (ref 7–25)
CALCIUM SPEC-SCNC: 9.2 MG/DL (ref 8.6–10.5)
CHLORIDE SERPL-SCNC: 101 MMOL/L (ref 98–107)
CHOLEST SERPL-MCNC: 202 MG/DL (ref 0–200)
CO2 SERPL-SCNC: 26 MMOL/L (ref 22–29)
CREAT SERPL-MCNC: 0.89 MG/DL (ref 0.76–1.27)
CREAT UR-MCNC: 126.3 MG/DL
GFR SERPL CREATININE-BSD FRML MDRD: 89 ML/MIN/1.73
GLOBULIN UR ELPH-MCNC: 3.1 GM/DL
GLUCOSE SERPL-MCNC: 166 MG/DL (ref 65–99)
HBA1C MFR BLD: 8.26 % (ref 4.8–5.6)
HDLC SERPL-MCNC: 29 MG/DL (ref 40–60)
LDLC SERPL CALC-MCNC: 119 MG/DL (ref 0–100)
LDLC/HDLC SERPL: 3.86 {RATIO}
MICROALBUMIN/CREAT UR: NORMAL MG/G{CREAT}
POTASSIUM SERPL-SCNC: 4.4 MMOL/L (ref 3.5–5.2)
PROT SERPL-MCNC: 7.2 G/DL (ref 6–8.5)
SODIUM SERPL-SCNC: 135 MMOL/L (ref 136–145)
TRIGL SERPL-MCNC: 306 MG/DL (ref 0–150)
VLDLC SERPL-MCNC: 54 MG/DL (ref 5–40)

## 2020-11-24 PROCEDURE — 80053 COMPREHEN METABOLIC PANEL: CPT

## 2020-11-24 PROCEDURE — 36415 COLL VENOUS BLD VENIPUNCTURE: CPT

## 2020-11-24 PROCEDURE — 82043 UR ALBUMIN QUANTITATIVE: CPT

## 2020-11-24 PROCEDURE — 83036 HEMOGLOBIN GLYCOSYLATED A1C: CPT

## 2020-11-24 PROCEDURE — 80061 LIPID PANEL: CPT

## 2020-11-24 PROCEDURE — 82570 ASSAY OF URINE CREATININE: CPT

## 2020-12-01 ENCOUNTER — OFFICE VISIT (OUTPATIENT)
Dept: ENDOCRINOLOGY | Facility: CLINIC | Age: 55
End: 2020-12-01

## 2020-12-01 VITALS
TEMPERATURE: 97.7 F | SYSTOLIC BLOOD PRESSURE: 125 MMHG | OXYGEN SATURATION: 99 % | WEIGHT: 279 LBS | HEIGHT: 68 IN | BODY MASS INDEX: 42.28 KG/M2 | HEART RATE: 77 BPM | DIASTOLIC BLOOD PRESSURE: 75 MMHG

## 2020-12-01 DIAGNOSIS — E78.2 MIXED HYPERLIPIDEMIA: ICD-10-CM

## 2020-12-01 DIAGNOSIS — E55.9 VITAMIN D DEFICIENCY: ICD-10-CM

## 2020-12-01 DIAGNOSIS — Z91.199 NONCOMPLIANCE: ICD-10-CM

## 2020-12-01 DIAGNOSIS — I10 HYPERTENSION, BENIGN: ICD-10-CM

## 2020-12-01 DIAGNOSIS — E11.65 TYPE 2 DIABETES MELLITUS WITH HYPERGLYCEMIA, WITHOUT LONG-TERM CURRENT USE OF INSULIN (HCC): Primary | ICD-10-CM

## 2020-12-01 LAB — GLUCOSE BLDC GLUCOMTR-MCNC: 161 MG/DL (ref 70–105)

## 2020-12-01 PROCEDURE — 82962 GLUCOSE BLOOD TEST: CPT | Performed by: INTERNAL MEDICINE

## 2020-12-01 PROCEDURE — 99214 OFFICE O/P EST MOD 30 MIN: CPT | Performed by: INTERNAL MEDICINE

## 2020-12-01 NOTE — PROGRESS NOTES
Endocrine Progress Note Outpatient     Patient Care Team:  Shawna Burnette MD as PCP - General  Provider, No Known as PCP - Family Medicine    Chief Complaint: Follow-up type 2 diabetes    HPI: 54-year-old male with history of type 2 diabetes, hyperlipidemia, vitamin D deficiency and obesity is here for follow-up.     For type 2 diabetes: Is supposed to be on Metformin 1500 mg p.o. daily but has not been taking it due to no good reason.  He tells me that he will start taking it now.  Not checking blood sugars at home.    Hyperlipidemia: Supposed to be on atorvastatin but has not been taking it either.    Vitamin D deficiency: He tells me that he used to be on vitamin D but has not been taking it.     Past Medical History:   Diagnosis Date   • Hyperlipidemia    • Hypertension    • Type 2 diabetes mellitus (CMS/[facility])    • Vitamin D deficiency        Social History     Socioeconomic History   • Marital status: Single     Spouse name: Not on file   • Number of children: Not on file   • Years of education: Not on file   • Highest education level: Not on file   Tobacco Use   • Smoking status: Never Smoker   • Smokeless tobacco: Never Used   Substance and Sexual Activity   • Alcohol use: No   • Drug use: No   • Sexual activity: Defer       Family History   Problem Relation Age of Onset   • Heart disease Mother    • Heart disease Father    • Cancer Father         leukemia       No Known Allergies    ROS:   Constitutional:  Denies fatigue, tiredness.    Eyes:  Denies change in visual acuity   HENT:  Denies nasal congestion or sore throat   Respiratory: denies cough, shortness of breath.   Cardiovascular:  denies chest pain, edema   GI:  Denies abdominal pain, nausea, vomiting.   Musculoskeletal:  Denies back pain or joint pain   Integument:  Denies dry skin and rash   Neurologic:  Denies headache, focal weakness or sensory changes   Endocrine:  Denies polyuria or polydipsia   Psychiatric:  Denies depression or  anxiety      Vitals:    12/01/20 1006   BP: 125/75   Pulse: 77   Temp: 97.7 °F (36.5 °C)   SpO2: 99%       Physical Exam:  GEN: NAD, conversant, obese  EYES: EOMI, PERRL, no conjunctival erythema  NECK: no thyromegaly, full ROM   CV: RRR, no murmurs/rubs/gallops, no peripheral edema  LUNG: CTAB, no wheezes/rales/ronchi  SKIN: no rashes, no acanthosis  MSK: no deformities, full ROM of all extremities  NEURO: no tremors, DTR normal  PSYCH: AOX3, appropriate mood, affect normal      Results Review:     I reviewed the patient's new clinical results.    Lab Results   Component Value Date    HGBA1C 8.26 (H) 11/24/2020    HGBA1C 7.0 (H) 06/09/2020    HGBA1C 6.8 (H) 11/25/2019      Lab Results   Component Value Date    GLUCOSE 166 (H) 11/24/2020    BUN 13 11/24/2020    CREATININE 0.89 11/24/2020    EGFRIFNONA 89 11/24/2020    BCR 14.6 11/24/2020    K 4.4 11/24/2020    CO2 26.0 11/24/2020    CALCIUM 9.2 11/24/2020    ALBUMIN 4.10 11/24/2020    LABIL2 1.4 05/29/2019    AST 39 11/24/2020    ALT 83 (H) 11/24/2020    CHOL 202 (H) 11/24/2020    TRIG 306 (H) 11/24/2020     (H) 11/24/2020    HDL 29 (L) 11/24/2020         Medication Review: Reviewed.       Current Outpatient Medications:   •  atorvastatin (LIPITOR) 20 MG tablet, TAKE 1 TABLET BY MOUTH DAILY, Disp: 90 tablet, Rfl: 1  •  metFORMIN ER (GLUCOPHAGE-XR) 500 MG 24 hr tablet, Take 1 tablet in am and 2 tablets at suppertime, Disp: 270 tablet, Rfl: 1  •  Omega-3 Fatty Acids ([employer] FISH OIL) 1000 MG capsule, 1 capsule Daily., Disp: , Rfl:       Assessment/Plan   1.  Diabetes mellitus type 2: Uncontrolled with high A1c of 8.26.  He tells me that he will start taking his medications on regular basis.  Advised to always keep glucose source in case of low blood sugar and continue to work on diet and activity.  Recommend to get annual eye exam and flu vaccine.    2.  Hyperlipidemia: Uncontrolled with high LDL and triglycerides, advised to resume atorvastatin and work on diet.   Will follow lipid panel.    3.  Vitamin D deficiency: Advised to resume vitamin D at least 2000 units a day.    4.  Noncompliance: He tells me that he stopped his medication but will resume it.  Risk of complication discussed with him.    5.  Hypertension: Well-controlled.            Arianna Murphy MD FACE.

## 2020-12-01 NOTE — PATIENT INSTRUCTIONS
Please take your medications on regular basis  Please continue to work on your diet and activity  Always keep glucose source in case of low blood sugar  Annual eye exam and flu vaccine  Labs before follow-up.

## 2021-05-27 ENCOUNTER — LAB (OUTPATIENT)
Dept: LAB | Facility: HOSPITAL | Age: 56
End: 2021-05-27

## 2021-05-27 DIAGNOSIS — E78.2 MIXED HYPERLIPIDEMIA: ICD-10-CM

## 2021-05-27 DIAGNOSIS — E11.65 TYPE 2 DIABETES MELLITUS WITH HYPERGLYCEMIA, WITHOUT LONG-TERM CURRENT USE OF INSULIN (HCC): ICD-10-CM

## 2021-05-27 LAB
ALBUMIN SERPL-MCNC: 4.2 G/DL (ref 3.5–5.2)
ALBUMIN/GLOB SERPL: 1.5 G/DL
ALP SERPL-CCNC: 56 U/L (ref 39–117)
ALT SERPL W P-5'-P-CCNC: 82 U/L (ref 1–41)
ANION GAP SERPL CALCULATED.3IONS-SCNC: 10.8 MMOL/L (ref 5–15)
AST SERPL-CCNC: 47 U/L (ref 1–40)
BILIRUB SERPL-MCNC: 0.5 MG/DL (ref 0–1.2)
BUN SERPL-MCNC: 16 MG/DL (ref 6–20)
BUN/CREAT SERPL: 16.2 (ref 7–25)
CALCIUM SPEC-SCNC: 9.2 MG/DL (ref 8.6–10.5)
CHLORIDE SERPL-SCNC: 102 MMOL/L (ref 98–107)
CHOLEST SERPL-MCNC: 206 MG/DL (ref 0–200)
CO2 SERPL-SCNC: 22.2 MMOL/L (ref 22–29)
CREAT SERPL-MCNC: 0.99 MG/DL (ref 0.76–1.27)
GFR SERPL CREATININE-BSD FRML MDRD: 79 ML/MIN/1.73
GLOBULIN UR ELPH-MCNC: 2.8 GM/DL
GLUCOSE SERPL-MCNC: 174 MG/DL (ref 65–99)
HBA1C MFR BLD: 9.3 % (ref 3.5–5.6)
HDLC SERPL-MCNC: 29 MG/DL (ref 40–60)
LDLC SERPL CALC-MCNC: 126 MG/DL (ref 0–100)
LDLC/HDLC SERPL: 4.14 {RATIO}
POTASSIUM SERPL-SCNC: 4.5 MMOL/L (ref 3.5–5.2)
PROT SERPL-MCNC: 7 G/DL (ref 6–8.5)
SODIUM SERPL-SCNC: 135 MMOL/L (ref 136–145)
TRIGL SERPL-MCNC: 284 MG/DL (ref 0–150)
VLDLC SERPL-MCNC: 51 MG/DL (ref 5–40)

## 2021-05-27 PROCEDURE — 36415 COLL VENOUS BLD VENIPUNCTURE: CPT

## 2021-05-27 PROCEDURE — 80053 COMPREHEN METABOLIC PANEL: CPT

## 2021-05-27 PROCEDURE — 80061 LIPID PANEL: CPT

## 2021-05-27 PROCEDURE — 83036 HEMOGLOBIN GLYCOSYLATED A1C: CPT

## 2021-06-01 ENCOUNTER — OFFICE VISIT (OUTPATIENT)
Dept: ENDOCRINOLOGY | Facility: CLINIC | Age: 56
End: 2021-06-01

## 2021-06-01 VITALS
WEIGHT: 275 LBS | OXYGEN SATURATION: 97 % | HEART RATE: 83 BPM | SYSTOLIC BLOOD PRESSURE: 132 MMHG | DIASTOLIC BLOOD PRESSURE: 75 MMHG | TEMPERATURE: 97.7 F | BODY MASS INDEX: 41.68 KG/M2 | HEIGHT: 68 IN

## 2021-06-01 DIAGNOSIS — I10 HYPERTENSION, BENIGN: ICD-10-CM

## 2021-06-01 DIAGNOSIS — E55.9 VITAMIN D DEFICIENCY: ICD-10-CM

## 2021-06-01 DIAGNOSIS — E11.65 TYPE 2 DIABETES MELLITUS WITH HYPERGLYCEMIA, WITHOUT LONG-TERM CURRENT USE OF INSULIN (HCC): Primary | ICD-10-CM

## 2021-06-01 DIAGNOSIS — Z91.199 NONCOMPLIANCE: ICD-10-CM

## 2021-06-01 DIAGNOSIS — E78.2 MIXED HYPERLIPIDEMIA: ICD-10-CM

## 2021-06-01 DIAGNOSIS — E66.01 CLASS 3 SEVERE OBESITY DUE TO EXCESS CALORIES WITHOUT SERIOUS COMORBIDITY WITH BODY MASS INDEX (BMI) OF 40.0 TO 44.9 IN ADULT (HCC): ICD-10-CM

## 2021-06-01 LAB — GLUCOSE BLDC GLUCOMTR-MCNC: 151 MG/DL (ref 70–105)

## 2021-06-01 PROCEDURE — 82962 GLUCOSE BLOOD TEST: CPT | Performed by: INTERNAL MEDICINE

## 2021-06-01 PROCEDURE — 99214 OFFICE O/P EST MOD 30 MIN: CPT | Performed by: INTERNAL MEDICINE

## 2021-06-01 RX ORDER — ATORVASTATIN CALCIUM 20 MG/1
20 TABLET, FILM COATED ORAL DAILY
Qty: 90 TABLET | Refills: 3
Start: 2021-06-01 | End: 2021-12-02

## 2021-06-01 RX ORDER — SITAGLIPTIN AND METFORMIN HYDROCHLORIDE 1000; 50 MG/1; MG/1
TABLET, FILM COATED ORAL
Qty: 60 TABLET | Refills: 6 | Status: SHIPPED | OUTPATIENT
Start: 2021-06-01 | End: 2021-11-01 | Stop reason: SDUPTHER

## 2021-06-01 NOTE — PROGRESS NOTES
Endocrine Progress Note Outpatient     Patient Care Team:  Shawna Burnette MD as PCP - General  Provider, No Known as PCP - Family Medicine    Chief Complaint: Follow-up type 2 diabetes    HPI: 55-year-old male with history of type 2 diabetes, hyperlipidemia, vitamin D deficiency and obesity is here for follow-up.     For type 2 diabetes: Is supposed to be on Metformin 1500 mg p.o. daily, he has been taking it about 60% of the time. Not checking blood sugars at home.  Has not been following diet very well either.    Hyperlipidemia: Supposed to be on atorvastatin but has not been taking it either.    Vitamin D deficiency: He tells me that he used to be on vitamin D but has not been taking it.     Past Medical History:   Diagnosis Date   • Hyperlipidemia    • Hypertension    • Type 2 diabetes mellitus (CMS/[facility])    • Vitamin D deficiency        Social History     Socioeconomic History   • Marital status: Single     Spouse name: Not on file   • Number of children: Not on file   • Years of education: Not on file   • Highest education level: Not on file   Tobacco Use   • Smoking status: Never Smoker   • Smokeless tobacco: Never Used   Vaping Use   • Vaping Use: Never used   Substance and Sexual Activity   • Alcohol use: No   • Drug use: No   • Sexual activity: Defer       Family History   Problem Relation Age of Onset   • Heart disease Mother    • Heart disease Father    • Cancer Father         leukemia       No Known Allergies    ROS:   Constitutional:  Denies fatigue, tiredness.    Eyes:  Denies change in visual acuity   HENT:  Denies nasal congestion or sore throat   Respiratory: denies cough, shortness of breath.   Cardiovascular:  denies chest pain, edema   GI:  Denies abdominal pain, nausea, vomiting.   Musculoskeletal:  Denies back pain or joint pain   Integument:  Denies dry skin and rash   Neurologic:  Denies headache, focal weakness or sensory changes   Endocrine:  Denies polyuria or polydipsia    Psychiatric:  Denies depression or anxiety      Vitals:    06/01/21 1302   BP: 132/75   Pulse: 83   Temp: 97.7 °F (36.5 °C)   SpO2: 97%       Physical Exam:  GEN: NAD, conversant, obese  EYES: EOMI, PERRL, no conjunctival erythema  NECK: no thyromegaly, full ROM   CV: RRR, no murmurs/rubs/gallops, no peripheral edema  LUNG: CTAB, no wheezes/rales/ronchi  SKIN: no rashes, no acanthosis  MSK: no deformities, full ROM of all extremities  NEURO: no tremors, DTR normal  PSYCH: AOX3, appropriate mood, affect normal      Results Review:     I reviewed the patient's new clinical results.    Lab Results   Component Value Date    HGBA1C 9.3 (H) 05/27/2021    HGBA1C 8.26 (H) 11/24/2020    HGBA1C 7.0 (H) 06/09/2020      Lab Results   Component Value Date    GLUCOSE 174 (H) 05/27/2021    BUN 16 05/27/2021    CREATININE 0.99 05/27/2021    EGFRIFNONA 79 05/27/2021    BCR 16.2 05/27/2021    K 4.5 05/27/2021    CO2 22.2 05/27/2021    CALCIUM 9.2 05/27/2021    ALBUMIN 4.20 05/27/2021    LABIL2 1.4 05/29/2019    AST 47 (H) 05/27/2021    ALT 82 (H) 05/27/2021    CHOL 206 (H) 05/27/2021    TRIG 284 (H) 05/27/2021     (H) 05/27/2021    HDL 29 (L) 05/27/2021         Medication Review: Reviewed.       Current Outpatient Medications:   •  metFORMIN ER (GLUCOPHAGE-XR) 500 MG 24 hr tablet, Take 1 tablet in am and 2 tablets at suppertime, Disp: 270 tablet, Rfl: 1      Assessment/Plan   1.  Diabetes mellitus type 2: Controlled with A1c now at 9%, at this time I will DC Metformin and add Janumet XR 50/thousand, 2 tablets p.o. daily.  He tells me that he will take his medications on regular basis from now onwards. Advised to always keep glucose source in case of low blood sugar and continue to work on diet and activity.  Recommend to get annual eye exam and flu vaccine.    2.  Hyperlipidemia: Uncontrolled with high LDL and triglycerides, advised to resume atorvastatin and work on diet.  Will follow lipid panel.    3.  Vitamin D  deficiency: Advised to resume vitamin D at least 2000 units a day.    4.  Noncompliance: He tells me that he stopped his medication but will resume it.  Risk of complication discussed with him.    5.  Hypertension: Well-controlled.    6.  Morbid obesity: Needs to work on his diet and activity.              Arianna Murphy MD FACE.

## 2021-06-01 NOTE — PATIENT INSTRUCTIONS
DC Metformin when run out  Start Janumet XR 50/thousand, 2 tablets p.o. daily  Resume atorvastatin at 10 mg once a day  Resume vitamin D at 2000 units daily  Please continue to work on your diet and activity  Labs before follow-up  Annual eye exam and flu vaccine

## 2021-11-01 RX ORDER — SITAGLIPTIN AND METFORMIN HYDROCHLORIDE 1000; 50 MG/1; MG/1
TABLET, FILM COATED ORAL
Qty: 180 TABLET | Refills: 1 | Status: SHIPPED | OUTPATIENT
Start: 2021-11-01 | End: 2021-12-02

## 2021-11-23 ENCOUNTER — LAB (OUTPATIENT)
Dept: LAB | Facility: HOSPITAL | Age: 56
End: 2021-11-23
Attending: INTERNAL MEDICINE

## 2021-11-23 DIAGNOSIS — E78.2 MIXED HYPERLIPIDEMIA: ICD-10-CM

## 2021-11-23 DIAGNOSIS — E11.65 TYPE 2 DIABETES MELLITUS WITH HYPERGLYCEMIA, WITHOUT LONG-TERM CURRENT USE OF INSULIN (HCC): ICD-10-CM

## 2021-11-23 LAB
ALBUMIN SERPL-MCNC: 4.4 G/DL (ref 3.5–5.2)
ALBUMIN UR-MCNC: <1.2 MG/DL
ALBUMIN/GLOB SERPL: 1.5 G/DL
ALP SERPL-CCNC: 54 U/L (ref 39–117)
ALT SERPL W P-5'-P-CCNC: 94 U/L (ref 1–41)
ANION GAP SERPL CALCULATED.3IONS-SCNC: 10.4 MMOL/L (ref 5–15)
AST SERPL-CCNC: 58 U/L (ref 1–40)
BILIRUB SERPL-MCNC: 0.8 MG/DL (ref 0–1.2)
BUN SERPL-MCNC: 13 MG/DL (ref 6–20)
BUN/CREAT SERPL: 13.4 (ref 7–25)
CALCIUM SPEC-SCNC: 9.4 MG/DL (ref 8.6–10.5)
CHLORIDE SERPL-SCNC: 102 MMOL/L (ref 98–107)
CHOLEST SERPL-MCNC: 197 MG/DL (ref 0–200)
CO2 SERPL-SCNC: 22.6 MMOL/L (ref 22–29)
CREAT SERPL-MCNC: 0.97 MG/DL (ref 0.76–1.27)
CREAT UR-MCNC: 143.5 MG/DL
GFR SERPL CREATININE-BSD FRML MDRD: 81 ML/MIN/1.73
GLOBULIN UR ELPH-MCNC: 2.9 GM/DL
GLUCOSE SERPL-MCNC: 153 MG/DL (ref 65–99)
HBA1C MFR BLD: 8.7 % (ref 3.5–5.6)
HDLC SERPL-MCNC: 30 MG/DL (ref 40–60)
LDLC SERPL CALC-MCNC: 133 MG/DL (ref 0–100)
LDLC/HDLC SERPL: 4.3 {RATIO}
MICROALBUMIN/CREAT UR: NORMAL MG/G{CREAT}
POTASSIUM SERPL-SCNC: 4.4 MMOL/L (ref 3.5–5.2)
PROT SERPL-MCNC: 7.3 G/DL (ref 6–8.5)
SODIUM SERPL-SCNC: 135 MMOL/L (ref 136–145)
TRIGL SERPL-MCNC: 190 MG/DL (ref 0–150)
TSH SERPL DL<=0.05 MIU/L-ACNC: 1.71 UIU/ML (ref 0.27–4.2)
VLDLC SERPL-MCNC: 34 MG/DL (ref 5–40)

## 2021-11-23 PROCEDURE — 83036 HEMOGLOBIN GLYCOSYLATED A1C: CPT

## 2021-11-23 PROCEDURE — 84443 ASSAY THYROID STIM HORMONE: CPT

## 2021-11-23 PROCEDURE — 82043 UR ALBUMIN QUANTITATIVE: CPT

## 2021-11-23 PROCEDURE — 80053 COMPREHEN METABOLIC PANEL: CPT

## 2021-11-23 PROCEDURE — 80061 LIPID PANEL: CPT

## 2021-11-23 PROCEDURE — 82570 ASSAY OF URINE CREATININE: CPT

## 2021-11-23 PROCEDURE — 36415 COLL VENOUS BLD VENIPUNCTURE: CPT

## 2021-11-23 RX ORDER — COVID-19 MOLECULAR TEST ASSAY
KIT MISCELLANEOUS SEE ADMIN INSTRUCTIONS
COMMUNITY
Start: 2021-09-09

## 2021-12-02 ENCOUNTER — OFFICE VISIT (OUTPATIENT)
Dept: ENDOCRINOLOGY | Facility: CLINIC | Age: 56
End: 2021-12-02

## 2021-12-02 VITALS
BODY MASS INDEX: 41.83 KG/M2 | TEMPERATURE: 97.5 F | HEIGHT: 68 IN | DIASTOLIC BLOOD PRESSURE: 80 MMHG | HEART RATE: 90 BPM | SYSTOLIC BLOOD PRESSURE: 150 MMHG | WEIGHT: 276 LBS | OXYGEN SATURATION: 98 %

## 2021-12-02 DIAGNOSIS — I10 HYPERTENSION, BENIGN: ICD-10-CM

## 2021-12-02 DIAGNOSIS — E11.65 TYPE 2 DIABETES MELLITUS WITH HYPERGLYCEMIA, WITHOUT LONG-TERM CURRENT USE OF INSULIN (HCC): Primary | ICD-10-CM

## 2021-12-02 DIAGNOSIS — E66.01 CLASS 3 SEVERE OBESITY DUE TO EXCESS CALORIES WITHOUT SERIOUS COMORBIDITY WITH BODY MASS INDEX (BMI) OF 40.0 TO 44.9 IN ADULT (HCC): ICD-10-CM

## 2021-12-02 DIAGNOSIS — E78.2 MIXED HYPERLIPIDEMIA: ICD-10-CM

## 2021-12-02 LAB — GLUCOSE BLDC GLUCOMTR-MCNC: 212 MG/DL (ref 70–105)

## 2021-12-02 PROCEDURE — 82962 GLUCOSE BLOOD TEST: CPT | Performed by: INTERNAL MEDICINE

## 2021-12-02 PROCEDURE — 99214 OFFICE O/P EST MOD 30 MIN: CPT | Performed by: INTERNAL MEDICINE

## 2021-12-02 RX ORDER — ATORVASTATIN CALCIUM 10 MG/1
10 TABLET, FILM COATED ORAL DAILY
Qty: 90 TABLET | Refills: 4 | Status: SHIPPED | OUTPATIENT
Start: 2021-12-02 | End: 2022-12-02

## 2021-12-02 RX ORDER — LOSARTAN POTASSIUM 25 MG/1
25 TABLET ORAL DAILY
Qty: 90 TABLET | Refills: 4 | Status: SHIPPED | OUTPATIENT
Start: 2021-12-02 | End: 2022-12-02

## 2021-12-02 RX ORDER — METFORMIN HYDROCHLORIDE 500 MG/1
TABLET, EXTENDED RELEASE ORAL
Qty: 360 TABLET | Refills: 6 | Status: SHIPPED | OUTPATIENT
Start: 2021-12-02

## 2021-12-02 RX ORDER — ERGOCALCIFEROL (VITAMIN D2) 50 MCG
2000 CAPSULE ORAL DAILY
Qty: 100 CAPSULE | Refills: 4 | Status: SHIPPED | OUTPATIENT
Start: 2021-12-02

## 2021-12-02 NOTE — PROGRESS NOTES
Endocrine Progress Note Outpatient     Patient Care Team:  Shawna Burnette MD as PCP - General    Chief Complaint: Follow-up type 2 diabetes    HPI: 55-year-old male with history of type 2 diabetes, hyperlipidemia, vitamin D deficiency and obesity is here for follow-up.     For type 2 diabetes: Currently on Janumet XR 50/thousand, 2 tablets p.o. daily.  He is taking his medications on regular basis now but unfortunately developing diarrhea with Janumet and would like to go back on Metformin.  Not checking blood sugars at home.  Has not been following diet very well either.    Hyperlipidemia: Supposed to be on atorvastatin but has not been taking it either.    Vitamin D deficiency: He tells me that he used to be on vitamin D but has not been taking it.     Past Medical History:   Diagnosis Date   • Hyperlipidemia    • Hypertension    • Type 2 diabetes mellitus (HCC)    • Vitamin D deficiency        Social History     Socioeconomic History   • Marital status: Single   Tobacco Use   • Smoking status: Never Smoker   • Smokeless tobacco: Never Used   Vaping Use   • Vaping Use: Never used   Substance and Sexual Activity   • Alcohol use: No   • Drug use: No   • Sexual activity: Defer       Family History   Problem Relation Age of Onset   • Heart disease Mother    • Heart disease Father    • Cancer Father         leukemia       No Known Allergies    ROS:   Constitutional:  Denies fatigue, tiredness.    Eyes:  Denies change in visual acuity   HENT:  Denies nasal congestion or sore throat   Respiratory: denies cough, shortness of breath.   Cardiovascular:  denies chest pain, edema   GI:  Denies abdominal pain, nausea, vomiting.   Musculoskeletal:  Denies back pain or joint pain   Integument:  Denies dry skin and rash   Neurologic:  Denies headache, focal weakness or sensory changes   Endocrine:  Denies polyuria or polydipsia   Psychiatric:  Denies depression or anxiety      Vitals:    12/02/21 1419   BP: 150/80   Pulse:  90   Temp: 97.5 °F (36.4 °C)   SpO2: 98%     BMI: 42    Physical Exam:  GEN: NAD, conversant, obese  EYES: EOMI, PERRL, no conjunctival erythema  NECK: no thyromegaly, full ROM   CV: RRR, no murmurs/rubs/gallops, no peripheral edema  LUNG: CTAB, no wheezes/rales/ronchi  SKIN: no rashes, no acanthosis  MSK: no deformities, full ROM of all extremities  NEURO: no tremors, DTR normal  PSYCH: AOX3, appropriate mood, affect normal      Results Review:     I reviewed the patient's new clinical results.    Lab Results   Component Value Date    HGBA1C 8.7 (H) 11/23/2021    HGBA1C 9.3 (H) 05/27/2021    HGBA1C 8.26 (H) 11/24/2020      Lab Results   Component Value Date    GLUCOSE 153 (H) 11/23/2021    BUN 13 11/23/2021    CREATININE 0.97 11/23/2021    EGFRIFNONA 81 11/23/2021    BCR 13.4 11/23/2021    K 4.4 11/23/2021    CO2 22.6 11/23/2021    CALCIUM 9.4 11/23/2021    ALBUMIN 4.40 11/23/2021    LABIL2 1.4 05/29/2019    AST 58 (H) 11/23/2021    ALT 94 (H) 11/23/2021    CHOL 197 11/23/2021    TRIG 190 (H) 11/23/2021     (H) 11/23/2021    HDL 30 (L) 11/23/2021         Medication Review: Reviewed.       Current Outpatient Medications:   •  sitaGLIPtin-metFORMIN (Janumet)  MG per tablet, Take 2 tablets p.o. daily., Disp: 180 tablet, Rfl: 1  •  ID Now COVID-19 kit, See Admin Instructions. for testing, Disp: , Rfl:       Assessment/Plan   1.  Diabetes mellitus type 2: Uncontrolled with high A1c, will DC Janumet due to diarrhea, he wants to try Metformin as he was not having diarrhea with Metformin.  We will put him on Metformin extended release 500 mg, take 2 tablets twice a day and add Jardiance 10 mg once a day.  Effects of Jardiance including but not limited to yeast infection the genital region spoke with him and advised if he develops any rash or itching in the genital area then call me. Advised to always keep glucose source in case of low blood sugar and continue to work on diet and activity.  Recommend to get  annual eye exam and flu vaccine.    2.  Hyperlipidemia: Uncontrolled with high LDL and triglycerides, advised to resume atorvastatin and work on diet.  Will follow lipid panel.    3.  Vitamin D deficiency: Advised to resume vitamin D at least 2000 units a day.    4.  Hypertension: New problem, will add losartan and follow blood pressure.    5.  Morbid obesity: Needs to work on his diet and activity.              Arianna Murphy MD FACE.

## 2021-12-02 NOTE — PATIENT INSTRUCTIONS
Please DC Janumet  Start Metformin  mg, take 2 tablets twice a day  Start Jardiance 10 mg once a day  Drink plenty of fluids and if you develop any itching or rash in the genital region then call me  Start atorvastatin 10 mg p.o. daily  Start losartan 25 mg p.o. daily  Start vitamin D 2000 units p.o. daily  Please work on your diet and activity  Always keep glucose source in case of low blood sugar  Annual eye exam and flu vaccine  Labs before follow-up.

## 2022-01-25 ENCOUNTER — LAB (OUTPATIENT)
Dept: LAB | Facility: HOSPITAL | Age: 57
End: 2022-01-25

## 2022-01-25 ENCOUNTER — TRANSCRIBE ORDERS (OUTPATIENT)
Dept: ADMINISTRATIVE | Facility: HOSPITAL | Age: 57
End: 2022-01-25

## 2022-01-25 DIAGNOSIS — Z11.52 ENCOUNTER FOR SCREENING FOR SEVERE ACUTE RESPIRATORY SYNDROME CORONAVIRUS 2 (SARS-COV-2) INFECTION: Primary | ICD-10-CM

## 2022-01-25 DIAGNOSIS — Z11.52 ENCOUNTER FOR SCREENING FOR SEVERE ACUTE RESPIRATORY SYNDROME CORONAVIRUS 2 (SARS-COV-2) INFECTION: ICD-10-CM

## 2022-01-25 LAB — SARS-COV-2 ORF1AB RESP QL NAA+PROBE: DETECTED

## 2022-01-25 PROCEDURE — U0004 COV-19 TEST NON-CDC HGH THRU: HCPCS

## 2022-01-25 PROCEDURE — C9803 HOPD COVID-19 SPEC COLLECT: HCPCS

## 2022-02-07 ENCOUNTER — TRANSCRIBE ORDERS (OUTPATIENT)
Dept: ADMINISTRATIVE | Facility: HOSPITAL | Age: 57
End: 2022-02-07

## 2022-02-07 ENCOUNTER — LAB (OUTPATIENT)
Dept: LAB | Facility: HOSPITAL | Age: 57
End: 2022-02-07

## 2022-02-07 DIAGNOSIS — Z11.52 ENCOUNTER FOR SCREENING FOR SEVERE ACUTE RESPIRATORY SYNDROME CORONAVIRUS 2 (SARS-COV-2) INFECTION: Primary | ICD-10-CM

## 2022-02-07 DIAGNOSIS — Z11.52 ENCOUNTER FOR SCREENING FOR SEVERE ACUTE RESPIRATORY SYNDROME CORONAVIRUS 2 (SARS-COV-2) INFECTION: ICD-10-CM

## 2022-02-07 LAB — SARS-COV-2 ORF1AB RESP QL NAA+PROBE: DETECTED

## 2022-02-07 PROCEDURE — U0004 COV-19 TEST NON-CDC HGH THRU: HCPCS

## 2022-02-07 PROCEDURE — C9803 HOPD COVID-19 SPEC COLLECT: HCPCS

## 2022-11-11 ENCOUNTER — APPOINTMENT (OUTPATIENT)
Dept: GENERAL RADIOLOGY | Facility: HOSPITAL | Age: 57
End: 2022-11-11

## 2022-11-11 ENCOUNTER — HOSPITAL ENCOUNTER (EMERGENCY)
Facility: HOSPITAL | Age: 57
Discharge: HOME | End: 2022-11-11
Attending: EMERGENCY MEDICINE | Admitting: EMERGENCY MEDICINE

## 2022-11-11 VITALS
DIASTOLIC BLOOD PRESSURE: 74 MMHG | WEIGHT: 263.45 LBS | HEIGHT: 66 IN | BODY MASS INDEX: 42.34 KG/M2 | HEART RATE: 88 BPM | RESPIRATION RATE: 16 BRPM | TEMPERATURE: 98 F | OXYGEN SATURATION: 100 % | SYSTOLIC BLOOD PRESSURE: 155 MMHG

## 2022-11-11 DIAGNOSIS — R68.83 CHILLS: ICD-10-CM

## 2022-11-11 DIAGNOSIS — R53.1 WEAKNESS: Primary | ICD-10-CM

## 2022-11-11 DIAGNOSIS — R50.9 FEVER, UNSPECIFIED FEVER CAUSE: ICD-10-CM

## 2022-11-11 LAB
ALBUMIN SERPL-MCNC: 4.1 G/DL (ref 3.5–5.2)
ALBUMIN/GLOB SERPL: 1.1 G/DL
ALP SERPL-CCNC: 66 U/L (ref 39–117)
ALT SERPL W P-5'-P-CCNC: 73 U/L (ref 1–41)
ANION GAP SERPL CALCULATED.3IONS-SCNC: 16 MMOL/L (ref 5–15)
AST SERPL-CCNC: 33 U/L (ref 1–40)
B PARAPERT DNA SPEC QL NAA+PROBE: NOT DETECTED
B PERT DNA SPEC QL NAA+PROBE: NOT DETECTED
BASOPHILS # BLD AUTO: 0 10*3/MM3 (ref 0–0.2)
BASOPHILS NFR BLD AUTO: 0.2 % (ref 0–1.5)
BILIRUB SERPL-MCNC: 2.6 MG/DL (ref 0–1.2)
BILIRUB UR QL STRIP: NEGATIVE
BUN SERPL-MCNC: 14 MG/DL (ref 6–20)
BUN/CREAT SERPL: 14.3 (ref 7–25)
C PNEUM DNA NPH QL NAA+NON-PROBE: NOT DETECTED
CALCIUM SPEC-SCNC: 9.3 MG/DL (ref 8.6–10.5)
CHLORIDE SERPL-SCNC: 95 MMOL/L (ref 98–107)
CLARITY UR: CLEAR
CO2 SERPL-SCNC: 20 MMOL/L (ref 22–29)
COLOR UR: ABNORMAL
CREAT SERPL-MCNC: 0.98 MG/DL (ref 0.76–1.27)
D-LACTATE SERPL-SCNC: 1.2 MMOL/L (ref 0.5–2)
DEPRECATED RDW RBC AUTO: 43.8 FL (ref 37–54)
EGFRCR SERPLBLD CKD-EPI 2021: 91.1 ML/MIN/1.73
EOSINOPHIL # BLD AUTO: 0 10*3/MM3 (ref 0–0.4)
EOSINOPHIL NFR BLD AUTO: 0.3 % (ref 0.3–6.2)
ERYTHROCYTE [DISTWIDTH] IN BLOOD BY AUTOMATED COUNT: 14 % (ref 12.3–15.4)
FLUAV SUBTYP SPEC NAA+PROBE: NOT DETECTED
FLUBV RNA ISLT QL NAA+PROBE: NOT DETECTED
GLOBULIN UR ELPH-MCNC: 3.7 GM/DL
GLUCOSE SERPL-MCNC: 207 MG/DL (ref 65–99)
GLUCOSE UR STRIP-MCNC: ABNORMAL MG/DL
HADV DNA SPEC NAA+PROBE: NOT DETECTED
HCOV 229E RNA SPEC QL NAA+PROBE: NOT DETECTED
HCOV HKU1 RNA SPEC QL NAA+PROBE: NOT DETECTED
HCOV NL63 RNA SPEC QL NAA+PROBE: NOT DETECTED
HCOV OC43 RNA SPEC QL NAA+PROBE: NOT DETECTED
HCT VFR BLD AUTO: 44.8 % (ref 37.5–51)
HGB BLD-MCNC: 14.9 G/DL (ref 13–17.7)
HGB UR QL STRIP.AUTO: NEGATIVE
HMPV RNA NPH QL NAA+NON-PROBE: NOT DETECTED
HOLD SPECIMEN: NORMAL
HPIV1 RNA ISLT QL NAA+PROBE: NOT DETECTED
HPIV2 RNA SPEC QL NAA+PROBE: NOT DETECTED
HPIV3 RNA NPH QL NAA+PROBE: NOT DETECTED
HPIV4 P GENE NPH QL NAA+PROBE: NOT DETECTED
KETONES UR QL STRIP: ABNORMAL
LEUKOCYTE ESTERASE UR QL STRIP.AUTO: NEGATIVE
LYMPHOCYTES # BLD AUTO: 0.9 10*3/MM3 (ref 0.7–3.1)
LYMPHOCYTES NFR BLD AUTO: 5.5 % (ref 19.6–45.3)
M PNEUMO IGG SER IA-ACNC: NOT DETECTED
MCH RBC QN AUTO: 28.4 PG (ref 26.6–33)
MCHC RBC AUTO-ENTMCNC: 33.2 G/DL (ref 31.5–35.7)
MCV RBC AUTO: 85.4 FL (ref 79–97)
MONOCYTES # BLD AUTO: 1.2 10*3/MM3 (ref 0.1–0.9)
MONOCYTES NFR BLD AUTO: 7.9 % (ref 5–12)
NEUTROPHILS NFR BLD AUTO: 13.6 10*3/MM3 (ref 1.7–7)
NEUTROPHILS NFR BLD AUTO: 86.1 % (ref 42.7–76)
NITRITE UR QL STRIP: NEGATIVE
NRBC BLD AUTO-RTO: 0 /100 WBC (ref 0–0.2)
PH UR STRIP.AUTO: <=5 [PH] (ref 5–8)
PLATELET # BLD AUTO: 192 10*3/MM3 (ref 140–450)
PMV BLD AUTO: 8.6 FL (ref 6–12)
POTASSIUM SERPL-SCNC: 4.3 MMOL/L (ref 3.5–5.2)
PROT SERPL-MCNC: 7.8 G/DL (ref 6–8.5)
PROT UR QL STRIP: NEGATIVE
RBC # BLD AUTO: 5.24 10*6/MM3 (ref 4.14–5.8)
RHINOVIRUS RNA SPEC NAA+PROBE: NOT DETECTED
RSV RNA NPH QL NAA+NON-PROBE: NOT DETECTED
SARS-COV-2 RNA NPH QL NAA+NON-PROBE: NOT DETECTED
SODIUM SERPL-SCNC: 131 MMOL/L (ref 136–145)
SP GR UR STRIP: 1.04 (ref 1–1.03)
TROPONIN T SERPL-MCNC: <0.01 NG/ML (ref 0–0.03)
UROBILINOGEN UR QL STRIP: ABNORMAL
WBC NRBC COR # BLD: 15.8 10*3/MM3 (ref 3.4–10.8)
WHOLE BLOOD HOLD COAG: NORMAL

## 2022-11-11 PROCEDURE — 71046 X-RAY EXAM CHEST 2 VIEWS: CPT

## 2022-11-11 PROCEDURE — 93005 ELECTROCARDIOGRAM TRACING: CPT | Performed by: EMERGENCY MEDICINE

## 2022-11-11 PROCEDURE — 80053 COMPREHEN METABOLIC PANEL: CPT | Performed by: EMERGENCY MEDICINE

## 2022-11-11 PROCEDURE — 85025 COMPLETE CBC W/AUTO DIFF WBC: CPT | Performed by: EMERGENCY MEDICINE

## 2022-11-11 PROCEDURE — 36415 COLL VENOUS BLD VENIPUNCTURE: CPT

## 2022-11-11 PROCEDURE — 25010000002 CEFTRIAXONE PER 250 MG: Performed by: EMERGENCY MEDICINE

## 2022-11-11 PROCEDURE — 96365 THER/PROPH/DIAG IV INF INIT: CPT

## 2022-11-11 PROCEDURE — 84484 ASSAY OF TROPONIN QUANT: CPT | Performed by: EMERGENCY MEDICINE

## 2022-11-11 PROCEDURE — 0202U NFCT DS 22 TRGT SARS-COV-2: CPT | Performed by: EMERGENCY MEDICINE

## 2022-11-11 PROCEDURE — 83605 ASSAY OF LACTIC ACID: CPT

## 2022-11-11 PROCEDURE — 81003 URINALYSIS AUTO W/O SCOPE: CPT | Performed by: EMERGENCY MEDICINE

## 2022-11-11 PROCEDURE — 87040 BLOOD CULTURE FOR BACTERIA: CPT | Performed by: EMERGENCY MEDICINE

## 2022-11-11 PROCEDURE — 99283 EMERGENCY DEPT VISIT LOW MDM: CPT

## 2022-11-11 RX ORDER — SODIUM CHLORIDE 0.9 % (FLUSH) 0.9 %
10 SYRINGE (ML) INJECTION AS NEEDED
Status: DISCONTINUED | OUTPATIENT
Start: 2022-11-11 | End: 2022-11-11 | Stop reason: HOSPADM

## 2022-11-11 RX ORDER — CEFDINIR 300 MG/1
300 CAPSULE ORAL 2 TIMES DAILY
Qty: 14 CAPSULE | Refills: 0 | Status: SHIPPED | OUTPATIENT
Start: 2022-11-11

## 2022-11-11 RX ADMIN — SODIUM CHLORIDE 1000 ML: 9 INJECTION, SOLUTION INTRAVENOUS at 04:30

## 2022-11-11 RX ADMIN — CEFTRIAXONE 2 G: 2 INJECTION, POWDER, FOR SOLUTION INTRAMUSCULAR; INTRAVENOUS at 06:37

## 2022-11-11 NOTE — ED PROVIDER NOTES
Subjective   History of Present Illness  Chief complaint fever chills sweating    History of present illness this is a 56-year-old male history of hypertension hyperlipidemia and diabetes he states that the last 24 hours he has been having fever and chills and sweats.  He states he does have a thermometer and has not taken his temperature but has been sweating and felt hot and he has chills.  He states he woke up in a sweat this morning.  He denies any cough congestion vomiting he has had a little bit of diarrhea nonbloody.  Denies any chest pain neck arm jaw pain no shortness of breath he denies any abdominal pain.  No headaches or stiff neck no visual changes.  No tick bites or rashes no dysuria or frequency.  No foreign travels no antibiotic use or recent hospitalization.  Denies any injuries.  No one in the home with similar illness.  Nothing really seems to make it better or worse has been taking Tylenol and ibuprofen.  Symptoms are moderate to severe.  No rashes or skin changes.  He has been at home treating symptomatically he talked to his sister who is a physician who referred him to the ER.        Review of Systems   Constitutional: Positive for chills and fever.   HENT: Negative for congestion and sinus pressure.    Eyes: Negative for photophobia and visual disturbance.   Respiratory: Negative for cough, chest tightness and shortness of breath.    Cardiovascular: Negative for chest pain and palpitations.   Gastrointestinal: Positive for diarrhea. Negative for abdominal pain and vomiting.   Endocrine: Negative for cold intolerance and heat intolerance.   Genitourinary: Negative for difficulty urinating and dysuria.   Musculoskeletal: Negative for back pain and neck pain.   Skin: Negative for rash and wound.   Neurological: Positive for weakness. Negative for dizziness, facial asymmetry, speech difficulty, light-headedness and headaches.   Psychiatric/Behavioral: Negative for agitation and confusion.        Past Medical History:   Diagnosis Date   • Hyperlipidemia    • Hypertension    • Type 2 diabetes mellitus (HCC)    • Vitamin D deficiency        No Known Allergies    Past Surgical History:   Procedure Laterality Date   • NO PAST SURGERIES         Family History   Problem Relation Age of Onset   • Heart disease Mother    • Heart disease Father    • Cancer Father         leukemia       Social History     Socioeconomic History   • Marital status: Single   Tobacco Use   • Smoking status: Never   • Smokeless tobacco: Never   Vaping Use   • Vaping Use: Never used   Substance and Sexual Activity   • Alcohol use: No   • Drug use: No   • Sexual activity: Defer     Prior to Admission medications    Medication Sig Start Date End Date Taking? Authorizing Provider   atorvastatin (Lipitor) 10 MG tablet Take 1 tablet by mouth Daily. 12/2/21 12/2/22  Arianna Murphy MD   cefdinir (OMNICEF) 300 MG capsule Take 1 capsule by mouth 2 (Two) Times a Day. 11/11/22   Isai Rubio MD   empagliflozin (Jardiance) 10 MG tablet tablet Take 1 tablet by mouth Daily. 12/2/21   Arianna Murphy MD   ID Now COVID-19 kit See Admin Instructions. for testing 9/9/21   Provider, MD Cintia   losartan (Cozaar) 25 MG tablet Take 1 tablet by mouth Daily. 12/2/21 12/2/22  Arianna Murphy MD   metFORMIN ER (GLUCOPHAGE-XR) 500 MG 24 hr tablet Take 2 tablets twice a day. 12/2/21   Arianna Murphy MD   Vitamin D, Ergocalciferol, 50 MCG (2000 UT) capsule Take 2,000 Units by mouth Daily. 12/2/21   Arianna Murphy MD           Objective   Physical Exam  Constitutional is a 56-year-old male awake alert no distress temperature 98.8 blood pressure 147/75 sats 100% on room air respirations 18 heart rate 90.  HEENT extraocular muscles are intact pupils equal round react there is no photophobia mouth is clear there is no exudate.  He denies sore throat tongue floor mouth normal.  Neck supple no adenopathy no JVD no bruits no meningeal signs.  Lungs clear no  retractions.  No use of accessories.  Back no cervical thoracic lumbar spine tenderness noted there is no redness or skin changes to the back.  Heart is regular without murmur.  Abdomen soft nontender good bowel sounds no peritoneal findings or pulsatile masses.  Extremities pulses equal upper and lower extremities no edema no cords no Homans' sign no evidence of DVT.  Skin is warm and dry.  There is no rash no cellulitic changes to any of his skin it was examined from head to toe trunk the abdomen the back of the legs and feet the hands the  area buttock area there is no sores or lesions or evidence of necrotizing fasciitis or cellulitis.  Neurologic he is awake alert orientated x3 no facial asymmetry normal speech focal weak  Procedures           ED Course      Results for orders placed or performed during the hospital encounter of 11/11/22   Respiratory Panel PCR w/COVID-19(SARS-CoV-2) SAMANTHA/PREM/NATHANIEL/PAD/COR/MAD/ELTON In-House, NP Swab in UTM/VTM, 3-4 HR TAT - Swab, Nasopharynx    Specimen: Nasopharynx; Swab   Result Value Ref Range    ADENOVIRUS, PCR Not Detected Not Detected    Coronavirus 229E Not Detected Not Detected    Coronavirus HKU1 Not Detected Not Detected    Coronavirus NL63 Not Detected Not Detected    Coronavirus OC43 Not Detected Not Detected    COVID19 Not Detected Not Detected - Ref. Range    Human Metapneumovirus Not Detected Not Detected    Human Rhinovirus/Enterovirus Not Detected Not Detected    Influenza A PCR Not Detected Not Detected    Influenza B PCR Not Detected Not Detected    Parainfluenza Virus 1 Not Detected Not Detected    Parainfluenza Virus 2 Not Detected Not Detected    Parainfluenza Virus 3 Not Detected Not Detected    Parainfluenza Virus 4 Not Detected Not Detected    RSV, PCR Not Detected Not Detected    Bordetella pertussis pcr Not Detected Not Detected    Bordetella parapertussis PCR Not Detected Not Detected    Chlamydophila pneumoniae PCR Not Detected Not Detected     Mycoplasma pneumo by PCR Not Detected Not Detected   Comprehensive Metabolic Panel    Specimen: Blood   Result Value Ref Range    Glucose 207 (H) 65 - 99 mg/dL    BUN 14 6 - 20 mg/dL    Creatinine 0.98 0.76 - 1.27 mg/dL    Sodium 131 (L) 136 - 145 mmol/L    Potassium 4.3 3.5 - 5.2 mmol/L    Chloride 95 (L) 98 - 107 mmol/L    CO2 20.0 (L) 22.0 - 29.0 mmol/L    Calcium 9.3 8.6 - 10.5 mg/dL    Total Protein 7.8 6.0 - 8.5 g/dL    Albumin 4.10 3.50 - 5.20 g/dL    ALT (SGPT) 73 (H) 1 - 41 U/L    AST (SGOT) 33 1 - 40 U/L    Alkaline Phosphatase 66 39 - 117 U/L    Total Bilirubin 2.6 (H) 0.0 - 1.2 mg/dL    Globulin 3.7 gm/dL    A/G Ratio 1.1 g/dL    BUN/Creatinine Ratio 14.3 7.0 - 25.0    Anion Gap 16.0 (H) 5.0 - 15.0 mmol/L    eGFR 91.1 >60.0 mL/min/1.73   Troponin    Specimen: Blood   Result Value Ref Range    Troponin T <0.010 0.000 - 0.030 ng/mL   Urinalysis With Microscopic If Indicated (No Culture) - Urine, Clean Catch    Specimen: Urine, Clean Catch   Result Value Ref Range    Color, UA Dark Yellow (A) Yellow, Straw    Appearance, UA Clear Clear    pH, UA <=5.0 5.0 - 8.0    Specific Gravity, UA 1.042 (H) 1.005 - 1.030    Glucose, UA >=1000 mg/dL (3+) (A) Negative    Ketones, UA 15 mg/dL (1+) (A) Negative    Bilirubin, UA Negative Negative    Blood, UA Negative Negative    Protein, UA Negative Negative    Leuk Esterase, UA Negative Negative    Nitrite, UA Negative Negative    Urobilinogen, UA 0.2 E.U./dL 0.2 - 1.0 E.U./dL   CBC Auto Differential    Specimen: Blood   Result Value Ref Range    WBC 15.80 (H) 3.40 - 10.80 10*3/mm3    RBC 5.24 4.14 - 5.80 10*6/mm3    Hemoglobin 14.9 13.0 - 17.7 g/dL    Hematocrit 44.8 37.5 - 51.0 %    MCV 85.4 79.0 - 97.0 fL    MCH 28.4 26.6 - 33.0 pg    MCHC 33.2 31.5 - 35.7 g/dL    RDW 14.0 12.3 - 15.4 %    RDW-SD 43.8 37.0 - 54.0 fl    MPV 8.6 6.0 - 12.0 fL    Platelets 192 140 - 450 10*3/mm3    Neutrophil % 86.1 (H) 42.7 - 76.0 %    Lymphocyte % 5.5 (L) 19.6 - 45.3 %    Monocyte % 7.9  5.0 - 12.0 %    Eosinophil % 0.3 0.3 - 6.2 %    Basophil % 0.2 0.0 - 1.5 %    Neutrophils, Absolute 13.60 (H) 1.70 - 7.00 10*3/mm3    Lymphocytes, Absolute 0.90 0.70 - 3.10 10*3/mm3    Monocytes, Absolute 1.20 (H) 0.10 - 0.90 10*3/mm3    Eosinophils, Absolute 0.00 0.00 - 0.40 10*3/mm3    Basophils, Absolute 0.00 0.00 - 0.20 10*3/mm3    nRBC 0.0 0.0 - 0.2 /100 WBC   POC Lactate    Specimen: Blood   Result Value Ref Range    Lactate 1.2 0.5 - 2.0 mmol/L   ECG 12 Lead Other; Weakness diaphoresis   Result Value Ref Range    QT Interval 363 ms   Gold Top - SST   Result Value Ref Range    Extra Tube Hold for add-ons.    Light Blue Top   Result Value Ref Range    Extra Tube Hold for add-ons.      No radiology results for the last day  Medications   sodium chloride 0.9 % flush 10 mL (has no administration in time range)   cefTRIAXone (ROCEPHIN) 2 g in sodium chloride 0.9 % 100 mL IVPB (2 g Intravenous New Bag 11/11/22 0637)   sodium chloride 0.9 % bolus 1,000 mL (0 mL Intravenous Stopped 11/11/22 0515)          EKG my interpretation normal sinus rhythm rate of 86 normal axis hypertrophy QTC of 434 I have nothing old to compare with otherwise unremarkable EKG     Chest x-ray obtained reviewed by me was unremarkable                             MDM  Number of Diagnoses or Management Options  Chills: new and requires workup  Fever, unspecified fever cause: new and requires workup  Weakness: new and requires workup  Diagnosis management comments: Medical decision making.  IV established placed on the monitor which showed a sinus rhythm.  Because of the sweating he was given EKG which is a normal sinus without acute findings.  Given a liter of saline and had labs obtained patient's respiratory panel was negative blood sugar 207 sodium 131 CO2 of 20 ALT of 73 troponin was negative.  Urine was negative other than thousand glucose of 15 ketones White count was 15.8 lactate was 1.2 and blood cultures are pending.  The patient repeat  exam was resting comfortably temperature was still 98.8.  Heart rate was noted to be in the 80s.  Blood pressure 150/74 respirations of 16 sats 100% on room air.  There is no rash no petechiae no purpura.  His neck was supple there is no photophobia chest x-ray obtained reviewed by me was negative.  I do not see evidence of pneumonia.  Patient has no evidence suggest acute myocardial infarction.  No ischemic changes on EKG troponins are negative.  Lactate was normal no evidence of sepsis at this point no evidence of cellulitis no evidence of acute intra-abdominal process.  He had fever and chills with no clear-cut answer to that here.  He has cultures pending.  We talked about the multitude of issues that can cause this as there are a multitude of things that can cause a fever and chills.  This is not a complete list above.  The patient get a gram Rocephin IV.  We talked about follow-up also even talked about inpatient but patient does not want to stay in the hospital.  His sister is a doctor he will check in with her and follow-up on his cultures.  And if he gets worse he will return to the ER.  He was given Rocephin 1 g IV he had Omnicef sent to his pharmacy and we talked about signs and symptoms and things to return for and he voices understanding.  All labs chest x-ray obtained by me stable unremarkable ER course       Amount and/or Complexity of Data Reviewed  Clinical lab tests: reviewed  Tests in the medicine section of CPT®: reviewed    Risk of Complications, Morbidity, and/or Mortality  Presenting problems: moderate  Diagnostic procedures: moderate  Management options: moderate    Patient Progress  Patient progress: stable      Final diagnoses:   Weakness   Chills   Fever, unspecified fever cause       ED Disposition  ED Disposition     ED Disposition   Discharge    Condition   Stable    Comment   --             Shawna Burnette MD  [address] [facility] [place] [address]  [phone]    In 1 day  Call  today    PATIENT CONNECTION - [facility] [address] [phone]  In 1 day      Susanne Kim NP  [address] [facility] [place] [address] [phone]    In 1 day           Medication List      New Prescriptions    cefdinir 300 MG capsule  Commonly known as: OMNICEF  Take 1 capsule by mouth 2 (Two) Times a Day.           Where to Get Your Medications      These medications were sent to [id number] - [place], [place] - [address] AT SEC OF [address] - [phone]  - [phone]   [address] [facility] [place] [address]    Phone: [phone]   · cefdinir 300 MG capsule          Isai Rubio MD  11/11/22 1641

## 2022-11-11 NOTE — DISCHARGE INSTRUCTIONS
Omnicef sent to pharmacy.  Rest plenty of fluids Tylenol for fevers may use ibuprofen as well.  Return for vomiting bloody diarrhea severe abdominal pain chest pain shortness of breath dizziness passing out uncontrolled fevers skin changes redness swelling edema no improvement next 1 to 2 days or any other new or worsening problems or concerns return immediately to the ER.  Retest for COVID if no better in the next 2 days

## 2022-11-11 NOTE — ED NOTES
Pt states he has been having a fever since tues evening and having sweating spells. Pt not sure of tmax states no thermometer at home, pt does say when he coughs he gets a pain in his stomach but denies any other sxs. Pt A&O x 4 VSS

## 2022-11-14 LAB — QT INTERVAL: 363 MS

## 2022-11-16 LAB
BACTERIA SPEC AEROBE CULT: NORMAL
BACTERIA SPEC AEROBE CULT: NORMAL